# Patient Record
Sex: FEMALE | Race: WHITE | NOT HISPANIC OR LATINO | Employment: OTHER | URBAN - METROPOLITAN AREA
[De-identification: names, ages, dates, MRNs, and addresses within clinical notes are randomized per-mention and may not be internally consistent; named-entity substitution may affect disease eponyms.]

---

## 2019-08-07 ENCOUNTER — OFFICE VISIT (OUTPATIENT)
Dept: MEDICAL GROUP | Facility: IMAGING CENTER | Age: 69
End: 2019-08-07
Payer: COMMERCIAL

## 2019-08-07 VITALS
DIASTOLIC BLOOD PRESSURE: 88 MMHG | BODY MASS INDEX: 37.38 KG/M2 | TEMPERATURE: 98.3 F | HEART RATE: 79 BPM | SYSTOLIC BLOOD PRESSURE: 128 MMHG | WEIGHT: 246.6 LBS | OXYGEN SATURATION: 93 % | RESPIRATION RATE: 15 BRPM | HEIGHT: 68 IN

## 2019-08-07 DIAGNOSIS — Z11.59 NEED FOR HEPATITIS C SCREENING TEST: ICD-10-CM

## 2019-08-07 DIAGNOSIS — Z86.006 HISTORY OF MELANOMA IN SITU: ICD-10-CM

## 2019-08-07 DIAGNOSIS — Z13.6 SCREENING FOR CARDIOVASCULAR CONDITION: ICD-10-CM

## 2019-08-07 DIAGNOSIS — Z12.39 SCREENING FOR BREAST CANCER: ICD-10-CM

## 2019-08-07 DIAGNOSIS — M25.541 ARTHRALGIA OF BOTH HANDS: ICD-10-CM

## 2019-08-07 DIAGNOSIS — Z82.49 FAMILY HISTORY OF CARDIOVASCULAR DISEASE: ICD-10-CM

## 2019-08-07 DIAGNOSIS — Z13.0 SCREENING FOR DEFICIENCY ANEMIA: ICD-10-CM

## 2019-08-07 DIAGNOSIS — Z13.1 SCREENING FOR DIABETES MELLITUS: ICD-10-CM

## 2019-08-07 DIAGNOSIS — E66.9 OBESITY (BMI 30-39.9): ICD-10-CM

## 2019-08-07 DIAGNOSIS — Z13.21 ENCOUNTER FOR VITAMIN DEFICIENCY SCREENING: ICD-10-CM

## 2019-08-07 DIAGNOSIS — Z12.11 SCREEN FOR COLON CANCER: ICD-10-CM

## 2019-08-07 DIAGNOSIS — Z13.29 SCREENING FOR ENDOCRINE DISORDER: ICD-10-CM

## 2019-08-07 DIAGNOSIS — M25.50 ARTHRALGIA, UNSPECIFIED JOINT: ICD-10-CM

## 2019-08-07 DIAGNOSIS — Z13.220 SCREENING, LIPID: ICD-10-CM

## 2019-08-07 DIAGNOSIS — M17.11 PRIMARY OSTEOARTHRITIS OF RIGHT KNEE: ICD-10-CM

## 2019-08-07 DIAGNOSIS — M25.542 ARTHRALGIA OF BOTH HANDS: ICD-10-CM

## 2019-08-07 PROCEDURE — 99205 OFFICE O/P NEW HI 60 MIN: CPT | Performed by: FAMILY MEDICINE

## 2019-08-07 ASSESSMENT — PAIN SCALES - GENERAL: PAINLEVEL: NO PAIN

## 2019-08-07 ASSESSMENT — PATIENT HEALTH QUESTIONNAIRE - PHQ9: CLINICAL INTERPRETATION OF PHQ2 SCORE: 0

## 2019-08-07 NOTE — PROGRESS NOTES
Chief Complaint   Patient presents with   • Establish Care     blood work requested.    • Arthritis     starting to get arthritic pain in joints        HPI:  68 y.o. female new patient here to review medical issues and establish care.   Her mother is a patient with our office.   Patient has not had any labs in 5 years. Would like to complete routine lab work.     Arthritic pain in joints:   In January- had evaluation and right side knee xray. Does gardening and may be on her knees.   Has done walking in the past, but has been limited due to right knee. No specific injury to area otherwise.  Does about 10 minutes of stretching daily.   Notices symptoms of 1st CMC joint region bilaterally. No specific injury to area otherwise.   Mother had some elevated uric acid and RF levels.     Obesity- trying to lose weight. Has lost about 10 lbs since January.     History of Melanoma in situ 2017 right side of forehead. Sees dermatology regularly- [facility], Dr. Param Armenta.   Left side of forehead- she believes was a basal cell cancer.     Her mother is 101 yo, had triple bypass around age 81 yo, also with heart failure, and chronic kidney issues.   Father passed from MI age 73 yo.      Health Maintenance  Last pap: 2012, 2011 endometrial ablation- slight bleeding at end menopause, period late 50s.   History of abnormal pap: negative  Immunizations: Td/Tdap reviewed recommendations;  Influenza vaccine recommend annually; Shingles- not done, reviewed recommendations.   PCV 13 declined; PPSV 23  declined   Mammogram: 2015  Colonoscopy: never, does not desire. No family history of colon.   Dexa Scan: 2012, declined at this time      Lifestyle:  Diet: coffee, some alcohol. Diet changing with mother's diet, helps with joint pain- cutting down sugars.   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    passed away 6 years ago, 2- 2.5 years took care of him, started in bladder. 6 months before mother came.  Work:  at [employer]- marriage, license. 15 years. Works full time.   Does okay at self care. 1 son, 34 yo.        Review of Systems   Constitutional: Negative for fever, chills and malaise/fatigue.   HENT: Negative for congestion, sore throat, or swallowing issues.   Eyes: Negative for pain or vision changes.   Respiratory: Negative for cough and shortness of breath.    Cardiovascular: Negative for leg swelling. No chest pain.   Gastrointestinal: Negative for nausea, vomiting, abdominal pain and diarrhea.   Genitourinary: Negative for dysuria and hematuria.   Skin: Negative for rash.   Neurological: Negative for dizziness, focal weakness and headaches.   Endo/Heme/Allergies: Does not bruise/bleed easily.   Psychiatric/Behavioral: Negative for depression.  The patient is not nervous/anxious.        Current Outpatient Medications:   •  NON SPECIFIED, every day. Indications: Multi-Vitamin, Disp: , Rfl:   •  NON SPECIFIED, every day. Indications: Omega 3, Disp: , Rfl:     Allergies   Allergen Reactions   • Seasonal        Patient Active Problem List   Diagnosis   • Arthralgia   • Obesity (BMI 30-39.9)   • History of melanoma in situ   • Family history of cardiovascular disease       Past Medical History:   Diagnosis Date   • 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, excised and followed by [facility]       Past Surgical History:   Procedure Laterality Date   • ENDOMETRIAL ABLATION  2011       Family History   Problem Relation Age of Onset   • Heart Disease Mother         renal heart failure   • Heart Disease Father        Social History     Socioeconomic History   • Marital status:      Spouse name: Not on file   • Number of children: Not on file   • Years of education: Not on file   • Highest education level: Not on file  "  Occupational History   • Not on file   Social Needs   • Financial resource strain: Not on file   • Food insecurity:     Worry: Not on file     Inability: Not on file   • Transportation needs:     Medical: Not on file     Non-medical: Not on file   Tobacco Use   • Smoking status: Never Smoker   • Smokeless tobacco: Never Used   Substance and Sexual Activity   • Alcohol use: Yes     Comment: occasional   • Drug use: Never   • Sexual activity: Not on file       PHYSICAL EXAM:  /88 (BP Location: Left arm, Patient Position: Sitting, BP Cuff Size: Adult)   Pulse 79   Temp 36.8 °C (98.3 °F) (Temporal)   Resp 15   Ht 1.727 m (5' 8\")   Wt 111.9 kg (246 lb 9.6 oz)   SpO2 93%   BMI 37.50 kg/m²   Constitutional: She appears well-developed and well-nourished. She appears not diaphoretic. No distress.   HENT: Right Ear: External ear normal. Left Ear: External ear normal. Tympanic membranes clear and intact.   Nose: Nose normal.   Mouth/Throat: Oropharynx is clear and moist. No oropharyngeal exudate.     Eyes: Conjunctivae and extraocular motions are normal. Pupils are equal, round, and reactive to light. No scleral icterus.   Neck: Normal range of motion. Neck supple. No thyromegaly present.   Cardiovascular: Normal rate, regular rhythm, normal heart sounds and intact distal pulses.  Exam reveals no gallop and no friction rub.  No murmur heard. No carotid bruits.   Pulmonary/Chest: Effort normal and breath sounds normal. No respiratory distress. She has no wheezes. She has no rales.   Abdominal: Soft. Bowel sounds are normal. She exhibits no distension and no mass. No tenderness. She has no rebound and no guarding.   Lymphadenopathy:  She has no cervical adenopathy.   Neurological: She is alert. She has normal reflexes. No cranial nerve deficit. She exhibits normal muscle tone.   Skin: Skin is warm and dry. No rash noted. She is not diaphoretic. No erythema.  Right side forehead with large circular area of well " healed scar. Left side with linear scar of forehead noted.   Psychiatric: She has a normal mood and affect. Her behavior is normal.   Musculoskeletal: She exhibits no edema. Full strength throughout. 2+ DTR throughout. Knees without erythema/edema/ecchymosis/effusion. Right medial knee region with mild TTP, negative Zakiya's, otherwise ligaments intact and stable. Hand joint pain appear to be at bilateral 1st CMC joint region, slight TTP, overall good range of motion. Popliteal angle: 170-180 deg bilaterally      Reviewed imaging of right knee xray: some bone spurring and decreased in medial joint space noted.       ASSESSMENT/PLAN:    This is a 68 y.o. female    1. Arthralgia, unspecified joint  -Will further assess lab work. Discussed modification in diet and activities. Recommend anti-inflammatory diet, routine stretching/strengthening and avoiding aggravating factors. Consider use of glucosamine chondroitin, turmeric and acupuncture therapy as well. May use tylenol as needed.   WESTERGREN SED RATE    CRP QUANTITIVE (NON-CARDIAC)    RHEUMATOID ARTHRITIS FACTOR    URIC ACID    TSH WITH REFLEX TO FT4   2. Primary osteoarthritis of right knee- as above.      3. Arthralgia of both hands- as above.      4. Obesity (BMI 30-39.9)   Patient's body mass index is 37.5 kg/m². Exercise and nutrition counseling were performed at this visit.  May benefit from further weight loss with joint pain symptoms. Counseled on healthy diet and routine exercise.     5. History of melanoma in situ     6. Family history of cardiovascular disease  CANCELED: HOMOCYSTEINE   7. Screening for breast cancer  MA-SCREEN MAMMO W/CAD-BILAT   8. Screen for colon cancer  COLOGUARD (FIT DNA)   9. Screening for diabetes mellitus  Comp Metabolic Panel   10. Screening for deficiency anemia  CBC WITH DIFFERENTIAL   11. Screening for cardiovascular condition  Lipid Profile   12. Screening, lipid  Lipid Profile   13. Screening for endocrine disorder  TSH  WITH REFLEX TO FT4   14. Encounter for vitamin deficiency screening  VITAMIN D,25 HYDROXY   15. Need for hepatitis C screening test  HEP C VIRUS ANTIBODY   Recommend working on self care with adequate nutrition and fluid intake, routine exercise, adequate sleep and stress management.   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 Recommend schedule time for self care, consider outside resources for assistance with care taking as needed.   Discussed recommendations for routine lab work and health maintenance recommendations.     Return in about 1 month (around 9/7/2019).      This medical record contains text that has been entered with the assistance of computer voice recognition and dictation software.  Therefore, it may contain unintended errors in text, spelling, punctuation, or grammar.         > 60 minutes face to face time spent with this patient of which > 30  minutes spent on counseling and coordination of care as above, excluding any time for procedures.

## 2019-08-12 ENCOUNTER — HOSPITAL ENCOUNTER (OUTPATIENT)
Dept: LAB | Facility: MEDICAL CENTER | Age: 69
End: 2019-08-12
Attending: FAMILY MEDICINE
Payer: COMMERCIAL

## 2019-08-12 DIAGNOSIS — Z13.21 ENCOUNTER FOR VITAMIN DEFICIENCY SCREENING: ICD-10-CM

## 2019-08-12 DIAGNOSIS — Z13.0 SCREENING FOR DEFICIENCY ANEMIA: ICD-10-CM

## 2019-08-12 DIAGNOSIS — Z13.29 SCREENING FOR ENDOCRINE DISORDER: ICD-10-CM

## 2019-08-12 DIAGNOSIS — Z13.6 SCREENING FOR CARDIOVASCULAR CONDITION: ICD-10-CM

## 2019-08-12 DIAGNOSIS — Z13.1 SCREENING FOR DIABETES MELLITUS: ICD-10-CM

## 2019-08-12 DIAGNOSIS — M25.50 ARTHRALGIA, UNSPECIFIED JOINT: ICD-10-CM

## 2019-08-12 DIAGNOSIS — Z82.49 FAMILY HISTORY OF CARDIOVASCULAR DISEASE: ICD-10-CM

## 2019-08-12 DIAGNOSIS — Z11.59 NEED FOR HEPATITIS C SCREENING TEST: ICD-10-CM

## 2019-08-12 DIAGNOSIS — Z13.220 SCREENING, LIPID: ICD-10-CM

## 2019-08-12 LAB
25(OH)D3 SERPL-MCNC: 19 NG/ML (ref 30–100)
ALBUMIN SERPL BCP-MCNC: 3.8 G/DL (ref 3.2–4.9)
ALBUMIN/GLOB SERPL: 1.2 G/DL
ALP SERPL-CCNC: 62 U/L (ref 30–99)
ALT SERPL-CCNC: 12 U/L (ref 2–50)
ANION GAP SERPL CALC-SCNC: 8 MMOL/L (ref 0–11.9)
AST SERPL-CCNC: 13 U/L (ref 12–45)
BASOPHILS # BLD AUTO: 0.8 % (ref 0–1.8)
BASOPHILS # BLD: 0.05 K/UL (ref 0–0.12)
BILIRUB SERPL-MCNC: 0.7 MG/DL (ref 0.1–1.5)
BUN SERPL-MCNC: 25 MG/DL (ref 8–22)
CALCIUM SERPL-MCNC: 9.4 MG/DL (ref 8.5–10.5)
CHLORIDE SERPL-SCNC: 106 MMOL/L (ref 96–112)
CHOLEST SERPL-MCNC: 208 MG/DL (ref 100–199)
CO2 SERPL-SCNC: 26 MMOL/L (ref 20–33)
CREAT SERPL-MCNC: 0.71 MG/DL (ref 0.5–1.4)
CRP SERPL HS-MCNC: 0.52 MG/DL (ref 0–0.75)
EOSINOPHIL # BLD AUTO: 0.19 K/UL (ref 0–0.51)
EOSINOPHIL NFR BLD: 3 % (ref 0–6.9)
ERYTHROCYTE [DISTWIDTH] IN BLOOD BY AUTOMATED COUNT: 42.8 FL (ref 35.9–50)
ERYTHROCYTE [SEDIMENTATION RATE] IN BLOOD BY WESTERGREN METHOD: 15 MM/HOUR (ref 0–30)
FASTING STATUS PATIENT QL REPORTED: NORMAL
GLOBULIN SER CALC-MCNC: 3.2 G/DL (ref 1.9–3.5)
GLUCOSE SERPL-MCNC: 108 MG/DL (ref 65–99)
HCT VFR BLD AUTO: 49.6 % (ref 37–47)
HCV AB SER QL: NEGATIVE
HDLC SERPL-MCNC: 42 MG/DL
HGB BLD-MCNC: 15.6 G/DL (ref 12–16)
IMM GRANULOCYTES # BLD AUTO: 0.02 K/UL (ref 0–0.11)
IMM GRANULOCYTES NFR BLD AUTO: 0.3 % (ref 0–0.9)
LDLC SERPL CALC-MCNC: 149 MG/DL
LYMPHOCYTES # BLD AUTO: 1.92 K/UL (ref 1–4.8)
LYMPHOCYTES NFR BLD: 30.8 % (ref 22–41)
MCH RBC QN AUTO: 28.5 PG (ref 27–33)
MCHC RBC AUTO-ENTMCNC: 31.5 G/DL (ref 33.6–35)
MCV RBC AUTO: 90.7 FL (ref 81.4–97.8)
MONOCYTES # BLD AUTO: 0.54 K/UL (ref 0–0.85)
MONOCYTES NFR BLD AUTO: 8.7 % (ref 0–13.4)
NEUTROPHILS # BLD AUTO: 3.52 K/UL (ref 2–7.15)
NEUTROPHILS NFR BLD: 56.4 % (ref 44–72)
NRBC # BLD AUTO: 0 K/UL
NRBC BLD-RTO: 0 /100 WBC
PLATELET # BLD AUTO: 179 K/UL (ref 164–446)
PMV BLD AUTO: 11.7 FL (ref 9–12.9)
POTASSIUM SERPL-SCNC: 4 MMOL/L (ref 3.6–5.5)
PROT SERPL-MCNC: 7 G/DL (ref 6–8.2)
RBC # BLD AUTO: 5.47 M/UL (ref 4.2–5.4)
RHEUMATOID FACT SER IA-ACNC: <10 IU/ML (ref 0–14)
SODIUM SERPL-SCNC: 140 MMOL/L (ref 135–145)
TRIGL SERPL-MCNC: 85 MG/DL (ref 0–149)
TSH SERPL DL<=0.005 MIU/L-ACNC: 2.1 UIU/ML (ref 0.38–5.33)
URATE SERPL-MCNC: 5.6 MG/DL (ref 1.9–8.2)
WBC # BLD AUTO: 6.2 K/UL (ref 4.8–10.8)

## 2019-08-12 PROCEDURE — 82306 VITAMIN D 25 HYDROXY: CPT

## 2019-08-12 PROCEDURE — 86431 RHEUMATOID FACTOR QUANT: CPT

## 2019-08-12 PROCEDURE — 83090 ASSAY OF HOMOCYSTEINE: CPT

## 2019-08-12 PROCEDURE — 85652 RBC SED RATE AUTOMATED: CPT

## 2019-08-12 PROCEDURE — 84443 ASSAY THYROID STIM HORMONE: CPT

## 2019-08-12 PROCEDURE — 86803 HEPATITIS C AB TEST: CPT

## 2019-08-12 PROCEDURE — 84550 ASSAY OF BLOOD/URIC ACID: CPT

## 2019-08-12 PROCEDURE — 36415 COLL VENOUS BLD VENIPUNCTURE: CPT

## 2019-08-12 PROCEDURE — 80053 COMPREHEN METABOLIC PANEL: CPT

## 2019-08-12 PROCEDURE — 85025 COMPLETE CBC W/AUTO DIFF WBC: CPT

## 2019-08-12 PROCEDURE — 86140 C-REACTIVE PROTEIN: CPT

## 2019-08-12 PROCEDURE — 80061 LIPID PANEL: CPT

## 2019-08-14 PROBLEM — Z82.49 FAMILY HISTORY OF CARDIOVASCULAR DISEASE: Status: ACTIVE | Noted: 2019-08-14

## 2019-08-14 PROBLEM — Z86.006 HISTORY OF MELANOMA IN SITU: Status: ACTIVE | Noted: 2019-08-14

## 2019-08-14 PROBLEM — E66.9 OBESITY (BMI 30-39.9): Status: ACTIVE | Noted: 2019-08-14

## 2019-08-14 PROBLEM — M25.50 ARTHRALGIA: Status: ACTIVE | Noted: 2019-08-14

## 2019-08-16 ENCOUNTER — HOSPITAL ENCOUNTER (OUTPATIENT)
Dept: RADIOLOGY | Facility: MEDICAL CENTER | Age: 69
End: 2019-08-16
Attending: FAMILY MEDICINE
Payer: COMMERCIAL

## 2019-08-16 DIAGNOSIS — Z12.39 SCREENING FOR BREAST CANCER: ICD-10-CM

## 2019-08-16 PROCEDURE — 77063 BREAST TOMOSYNTHESIS BI: CPT

## 2019-09-25 ENCOUNTER — OFFICE VISIT (OUTPATIENT)
Dept: MEDICAL GROUP | Facility: IMAGING CENTER | Age: 69
End: 2019-09-25
Payer: COMMERCIAL

## 2019-09-25 VITALS
DIASTOLIC BLOOD PRESSURE: 84 MMHG | HEART RATE: 74 BPM | OXYGEN SATURATION: 94 % | SYSTOLIC BLOOD PRESSURE: 126 MMHG | RESPIRATION RATE: 12 BRPM | HEIGHT: 68 IN | WEIGHT: 243 LBS | TEMPERATURE: 98.4 F | BODY MASS INDEX: 36.83 KG/M2

## 2019-09-25 DIAGNOSIS — M17.11 PRIMARY OSTEOARTHRITIS OF RIGHT KNEE: ICD-10-CM

## 2019-09-25 DIAGNOSIS — M25.542 ARTHRALGIA OF BOTH HANDS: ICD-10-CM

## 2019-09-25 DIAGNOSIS — E66.9 OBESITY (BMI 30-39.9): ICD-10-CM

## 2019-09-25 DIAGNOSIS — E55.9 VITAMIN D INSUFFICIENCY: ICD-10-CM

## 2019-09-25 DIAGNOSIS — M25.541 ARTHRALGIA OF BOTH HANDS: ICD-10-CM

## 2019-09-25 DIAGNOSIS — E78.5 HYPERLIPIDEMIA LDL GOAL <130: ICD-10-CM

## 2019-09-25 DIAGNOSIS — R73.01 IFG (IMPAIRED FASTING GLUCOSE): ICD-10-CM

## 2019-09-25 DIAGNOSIS — M25.50 ARTHRALGIA OF MULTIPLE JOINTS: ICD-10-CM

## 2019-09-25 DIAGNOSIS — R71.8 ELEVATED HEMATOCRIT: ICD-10-CM

## 2019-09-25 DIAGNOSIS — Z23 NEED FOR INFLUENZA VACCINATION: ICD-10-CM

## 2019-09-25 PROCEDURE — 90662 IIV NO PRSV INCREASED AG IM: CPT | Performed by: FAMILY MEDICINE

## 2019-09-25 PROCEDURE — 99214 OFFICE O/P EST MOD 30 MIN: CPT | Mod: 25 | Performed by: FAMILY MEDICINE

## 2019-09-25 PROCEDURE — 90471 IMMUNIZATION ADMIN: CPT | Performed by: FAMILY MEDICINE

## 2019-09-25 SDOH — HEALTH STABILITY: MENTAL HEALTH: HOW OFTEN DO YOU HAVE A DRINK CONTAINING ALCOHOL?: MONTHLY OR LESS

## 2019-09-25 ASSESSMENT — PAIN SCALES - GENERAL: PAINLEVEL: 2=MINIMAL-SLIGHT

## 2019-09-25 NOTE — PROGRESS NOTES
SUBJECTIVE:    Chief Complaint   Patient presents with   • Results   • Arthritis       HPI:     Oneyda Pratt is a 68 y.o. female here for for follow-up of prior issues and lab results.    She recently started a new omega-3 supplement 500 mg daily.  Garden [employer] life brand.  Also started a new multivitamin.  She has been taking vitamin D 1000 IUs daily.  Recent lab results show low levels.  She will plan to increase her dose.    Obesity- She has been working on weight loss and has lost a few pounds since her last visit.  She does stress eat at times.  She knows what to do but does not have a regular exercise regimen in place currently.  She does otherwise eat healthy at home with her mother who is 101 yo.    Elevated hematocrit-on recent blood work. Drank a little water prior to lab work.   Elevated glucose-mild.  Elevated cholesterol and LDL noted on recent labs.     Arthritis joint pain- stable. Labs negative for RF and CRP.        ROS:  Denies any recent fevers or chills. No nausea or vomiting. No diarrhea. No chest pains or shortness of breath. No lower extremity edema.    Current Outpatient Medications on File Prior to Visit   Medication Sig Dispense Refill   • vitamin D (CHOLECALCIFEROL) 1000 UNIT Tab Take 1,000 Units by mouth every day.     • NON SPECIFIED every day. Indications: Multi-Vitamin     • NON SPECIFIED 500 mg every day. Indications: Omega 3       No current facility-administered medications on file prior to visit.        Allergies   Allergen Reactions   • Seasonal        Patient Active Problem List    Diagnosis Date Noted   • Arthralgia of multiple joints 08/14/2019   • Obesity (BMI 30-39.9) 08/14/2019   • History of melanoma in situ 08/14/2019   • Family history of cardiovascular disease 08/14/2019       Past Medical History:   Diagnosis Date   • 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, excised and followed by [facility]       OBJECTIVE:   /84   Pulse 74   Temp 36.9 °C (98.4  "°F)   Resp 12   Ht 1.727 m (5' 8\")   Wt 110.2 kg (243 lb)   SpO2 94%   BMI 36.95 kg/m²   General: Well-developed well-nourished female, no acute distress  Neck: supple, no lymphadenopathy- cervical or supraclavicular, no thyromegaly  Cardiovascular: regular rate and rhythm, no murmurs, gallops, rubs  Lungs: clear to auscultation bilaterally, no wheezes, crackles, or rhonchi  Abdomen: +bowel sounds, soft, nontender, nondistended, no rebound, no guarding, no hepatosplenomegaly  Extremities: no cyanosis, clubbing, edema  Skin: Warm and dry  Psych: appropriate mood and affect       Ref. Range 8/12/2019 06:20   WBC Latest Ref Range: 4.8 - 10.8 K/uL 6.2   RBC Latest Ref Range: 4.20 - 5.40 M/uL 5.47 (H)   Hemoglobin Latest Ref Range: 12.0 - 16.0 g/dL 15.6   Hematocrit Latest Ref Range: 37.0 - 47.0 % 49.6 (H)   MCV Latest Ref Range: 81.4 - 97.8 fL 90.7   MCH Latest Ref Range: 27.0 - 33.0 pg 28.5   MCHC Latest Ref Range: 33.6 - 35.0 g/dL 31.5 (L)   RDW Latest Ref Range: 35.9 - 50.0 fL 42.8   Platelet Count Latest Ref Range: 164 - 446 K/uL 179   MPV Latest Ref Range: 9.0 - 12.9 fL 11.7   Neutrophils-Polys Latest Ref Range: 44.00 - 72.00 % 56.40   Neutrophils (Absolute) Latest Ref Range: 2.00 - 7.15 K/uL 3.52   Lymphocytes Latest Ref Range: 22.00 - 41.00 % 30.80   Lymphs (Absolute) Latest Ref Range: 1.00 - 4.80 K/uL 1.92   Monocytes Latest Ref Range: 0.00 - 13.40 % 8.70   Monos (Absolute) Latest Ref Range: 0.00 - 0.85 K/uL 0.54   Eosinophils Latest Ref Range: 0.00 - 6.90 % 3.00   Eos (Absolute) Latest Ref Range: 0.00 - 0.51 K/uL 0.19   Basophils Latest Ref Range: 0.00 - 1.80 % 0.80   Baso (Absolute) Latest Ref Range: 0.00 - 0.12 K/uL 0.05   Immature Granulocytes Latest Ref Range: 0.00 - 0.90 % 0.30   Immature Granulocytes (abs) Latest Ref Range: 0.00 - 0.11 K/uL 0.02   Nucleated RBC Latest Units: /100 WBC 0.00   NRBC (Absolute) Latest Units: K/uL 0.00   Sed Rate [facility] Latest Ref Range: 0 - 30 mm/hour 15   Sodium " Latest Ref Range: 135 - 145 mmol/L 140   Potassium Latest Ref Range: 3.6 - 5.5 mmol/L 4.0   Chloride Latest Ref Range: 96 - 112 mmol/L 106   Co2 Latest Ref Range: 20 - 33 mmol/L 26   Anion Gap Latest Ref Range: 0.0 - 11.9  8.0   Glucose Latest Ref Range: 65 - 99 mg/dL 108 (H)   Bun Latest Ref Range: 8 - 22 mg/dL 25 (H)   Creatinine Latest Ref Range: 0.50 - 1.40 mg/dL 0.71   GFR If  Latest Ref Range: >60 mL/min/1.73 m 2 >60   GFR If Non  Latest Ref Range: >60 mL/min/1.73 m 2 >60   Calcium Latest Ref Range: 8.5 - 10.5 mg/dL 9.4   AST(SGOT) Latest Ref Range: 12 - 45 U/L 13   ALT(SGPT) Latest Ref Range: 2 - 50 U/L 12   Alkaline Phosphatase Latest Ref Range: 30 - 99 U/L 62   Total Bilirubin Latest Ref Range: 0.1 - 1.5 mg/dL 0.7   Albumin Latest Ref Range: 3.2 - 4.9 g/dL 3.8   Total Protein Latest Ref Range: 6.0 - 8.2 g/dL 7.0   Globulin Latest Ref Range: 1.9 - 3.5 g/dL 3.2   A-G Ratio Latest Units: g/dL 1.2   Uric Acid Latest Ref Range: 1.9 - 8.2 mg/dL 5.6   Fasting Status Unknown Fasting   Cholesterol,Tot Latest Ref Range: 100 - 199 mg/dL 208 (H)   Triglycerides Latest Ref Range: 0 - 149 mg/dL 85   HDL Latest Ref Range: >=40 mg/dL 42   LDL Latest Ref Range: <100 mg/dL 149 (H)   25-Hydroxy   Vitamin D 25 Latest Ref Range: 30 - 100 ng/mL 19 (L)   TSH Latest Ref Range: 0.380 - 5.330 uIU/mL 2.100   Hepatitis C Antibody Latest Ref Range: Negative  Negative   Rheumatoid Factor -Neph- Latest Ref Range: 0 - 14 IU/mL <10   Stat C-Reactive Protein Latest Ref Range: 0.00 - 0.75 mg/dL 0.52         ASSESSMENT/PLAN:    68 y.o.female with arthralgia.     1. Hyperlipidemia LDL goal <130   -She will try diet and lifestyle modification, discussed in further detail today. Recommend low cholesterol, high fiber diet. Recommend routine exercise. Consider supplement therapy if needed. Repeat labs in 4-6 months.  HOMOCYSTEINE    Comp Metabolic Panel    Lipid Profile   2. IFG (impaired fasting glucose)-mild.    -Reviewed diet and exercise recommendations.      3. Elevated hematocrit - unclear etiology.   -Discussed adequate hydration. Will recheck in future.  CBC WITHOUT DIFFERENTIAL   4. Vitamin D insufficiency   -Vitamin D3, 3346-2902 IU daily. Recheck labs in future.  VITAMIN D,25 HYDROXY   5. Obesity (BMI 30-39.9)   Patient's body mass index is 36.95 kg/m². Exercise and nutrition counseling were performed at this visit.    6. Arthralgia of multiple joints- stable. Reviewed labs.   -Recommend gentle and routine exercise regimen. Consider anti-inflammatory diet, supplements and acupuncture therapy. Monitor.      7. Arthralgia of both hands     8. Primary osteoarthritis of right knee     9. Need for influenza vaccination  INFLUENZA VACCINE, HIGH DOSE (65+ ONLY)   Recommend working on self care with adequate nutrition and fluid intake, routine exercise, adequate sleep and stress management.       Return in about 4 months (around 1/25/2020).    This medical record contains text that has been entered with the assistance of computer voice recognition and dictation software.  Therefore, it may contain unintended errors in text, spelling, punctuation, or grammar.

## 2020-06-24 ENCOUNTER — NON-PROVIDER VISIT (OUTPATIENT)
Dept: MEDICAL GROUP | Facility: IMAGING CENTER | Age: 70
End: 2020-06-24
Payer: COMMERCIAL

## 2020-06-24 ENCOUNTER — HOSPITAL ENCOUNTER (OUTPATIENT)
Facility: MEDICAL CENTER | Age: 70
End: 2020-06-24
Attending: FAMILY MEDICINE
Payer: COMMERCIAL

## 2020-06-24 DIAGNOSIS — R10.9 FLANK PAIN: ICD-10-CM

## 2020-06-24 LAB
APPEARANCE UR: NORMAL
BILIRUB UR STRIP-MCNC: NEGATIVE MG/DL
COLOR UR AUTO: YELLOW
GLUCOSE UR STRIP.AUTO-MCNC: NEGATIVE MG/DL
KETONES UR STRIP.AUTO-MCNC: NEGATIVE MG/DL
LEUKOCYTE ESTERASE UR QL STRIP.AUTO: NORMAL
NITRITE UR QL STRIP.AUTO: NEGATIVE
PH UR STRIP.AUTO: 5 [PH] (ref 5–8)
PROT UR QL STRIP: NEGATIVE MG/DL
RBC UR QL AUTO: NEGATIVE
SP GR UR STRIP.AUTO: 1.03
UROBILINOGEN UR STRIP-MCNC: 0.2 MG/DL

## 2020-06-24 PROCEDURE — 87077 CULTURE AEROBIC IDENTIFY: CPT

## 2020-06-24 PROCEDURE — 87086 URINE CULTURE/COLONY COUNT: CPT

## 2020-06-24 PROCEDURE — 87186 SC STD MICRODIL/AGAR DIL: CPT

## 2020-06-24 PROCEDURE — 81002 URINALYSIS NONAUTO W/O SCOPE: CPT | Performed by: FAMILY MEDICINE

## 2020-06-25 NOTE — PROGRESS NOTES
Pt reporting bladder pressure and left sided flank pain. Provided urine sample. Results reviewed with Dr. Martinez. Sample sent for culture.

## 2020-06-28 LAB
BACTERIA UR CULT: ABNORMAL
BACTERIA UR CULT: ABNORMAL
SIGNIFICANT IND 70042: ABNORMAL
SITE SITE: ABNORMAL
SOURCE SOURCE: ABNORMAL

## 2020-06-30 RX ORDER — SULFAMETHOXAZOLE AND TRIMETHOPRIM 800; 160 MG/1; MG/1
1 TABLET ORAL 2 TIMES DAILY
Qty: 6 TAB | Refills: 0 | Status: SHIPPED | OUTPATIENT
Start: 2020-06-30 | End: 2021-02-17

## 2020-07-11 ENCOUNTER — HOSPITAL ENCOUNTER (OUTPATIENT)
Dept: LAB | Facility: MEDICAL CENTER | Age: 70
End: 2020-07-11
Attending: FAMILY MEDICINE
Payer: COMMERCIAL

## 2020-07-11 DIAGNOSIS — E78.5 HYPERLIPIDEMIA LDL GOAL <130: ICD-10-CM

## 2020-07-11 LAB
ALBUMIN SERPL BCP-MCNC: 4.2 G/DL (ref 3.2–4.9)
ALBUMIN/GLOB SERPL: 1.4 G/DL
ALP SERPL-CCNC: 73 U/L (ref 30–99)
ALT SERPL-CCNC: 17 U/L (ref 2–50)
ANION GAP SERPL CALC-SCNC: 8 MMOL/L (ref 7–16)
AST SERPL-CCNC: 14 U/L (ref 12–45)
BILIRUB SERPL-MCNC: 0.6 MG/DL (ref 0.1–1.5)
BUN SERPL-MCNC: 20 MG/DL (ref 8–22)
CALCIUM SERPL-MCNC: 9.1 MG/DL (ref 8.5–10.5)
CHLORIDE SERPL-SCNC: 103 MMOL/L (ref 96–112)
CHOLEST SERPL-MCNC: 214 MG/DL (ref 100–199)
CO2 SERPL-SCNC: 25 MMOL/L (ref 20–33)
CREAT SERPL-MCNC: 0.57 MG/DL (ref 0.5–1.4)
FASTING STATUS PATIENT QL REPORTED: NORMAL
GLOBULIN SER CALC-MCNC: 2.9 G/DL (ref 1.9–3.5)
GLUCOSE SERPL-MCNC: 120 MG/DL (ref 65–99)
HCYS SERPL-SCNC: 7.33 UMOL/L
HDLC SERPL-MCNC: 49 MG/DL
LDLC SERPL CALC-MCNC: 146 MG/DL
POTASSIUM SERPL-SCNC: 4.2 MMOL/L (ref 3.6–5.5)
PROT SERPL-MCNC: 7.1 G/DL (ref 6–8.2)
SODIUM SERPL-SCNC: 136 MMOL/L (ref 135–145)
TRIGL SERPL-MCNC: 97 MG/DL (ref 0–149)

## 2020-07-11 PROCEDURE — 80061 LIPID PANEL: CPT

## 2020-07-11 PROCEDURE — 36415 COLL VENOUS BLD VENIPUNCTURE: CPT

## 2020-07-11 PROCEDURE — 80053 COMPREHEN METABOLIC PANEL: CPT

## 2020-07-11 PROCEDURE — 83090 ASSAY OF HOMOCYSTEINE: CPT

## 2020-07-17 ENCOUNTER — HOSPITAL ENCOUNTER (OUTPATIENT)
Facility: MEDICAL CENTER | Age: 70
End: 2020-07-17
Attending: FAMILY MEDICINE
Payer: COMMERCIAL

## 2020-07-17 ENCOUNTER — OFFICE VISIT (OUTPATIENT)
Dept: MEDICAL GROUP | Facility: IMAGING CENTER | Age: 70
End: 2020-07-17
Payer: COMMERCIAL

## 2020-07-17 VITALS
DIASTOLIC BLOOD PRESSURE: 76 MMHG | TEMPERATURE: 98.3 F | BODY MASS INDEX: 36.98 KG/M2 | OXYGEN SATURATION: 98 % | HEIGHT: 68 IN | HEART RATE: 76 BPM | RESPIRATION RATE: 12 BRPM | WEIGHT: 244 LBS | SYSTOLIC BLOOD PRESSURE: 122 MMHG

## 2020-07-17 DIAGNOSIS — Z86.006 HISTORY OF MELANOMA IN SITU: ICD-10-CM

## 2020-07-17 DIAGNOSIS — Z82.49 FAMILY HISTORY OF CARDIOVASCULAR DISEASE: ICD-10-CM

## 2020-07-17 DIAGNOSIS — Z78.0 POST-MENOPAUSAL: ICD-10-CM

## 2020-07-17 DIAGNOSIS — R82.90 ABNORMAL URINE: ICD-10-CM

## 2020-07-17 DIAGNOSIS — E78.5 HYPERLIPIDEMIA LDL GOAL <130: ICD-10-CM

## 2020-07-17 DIAGNOSIS — M25.50 ARTHRALGIA OF MULTIPLE JOINTS: ICD-10-CM

## 2020-07-17 DIAGNOSIS — L53.9 ERYTHEMA OF SKIN: ICD-10-CM

## 2020-07-17 DIAGNOSIS — Z12.11 SCREEN FOR COLON CANCER: ICD-10-CM

## 2020-07-17 DIAGNOSIS — R73.03 PREDIABETES: ICD-10-CM

## 2020-07-17 DIAGNOSIS — F43.9 STRESS: ICD-10-CM

## 2020-07-17 DIAGNOSIS — R73.01 IFG (IMPAIRED FASTING GLUCOSE): ICD-10-CM

## 2020-07-17 DIAGNOSIS — E66.9 OBESITY (BMI 30-39.9): ICD-10-CM

## 2020-07-17 LAB
APPEARANCE UR: NORMAL
BILIRUB UR STRIP-MCNC: NORMAL MG/DL
COLOR UR AUTO: YELLOW
GLUCOSE UR STRIP.AUTO-MCNC: NORMAL MG/DL
HBA1C MFR BLD: 6 % (ref 0–5.6)
INT CON NEG: NEGATIVE
INT CON POS: POSITIVE
KETONES UR STRIP.AUTO-MCNC: 15 MG/DL
LEUKOCYTE ESTERASE UR QL STRIP.AUTO: NORMAL
NITRITE UR QL STRIP.AUTO: NORMAL
PH UR STRIP.AUTO: 6 [PH] (ref 5–8)
PROT UR QL STRIP: 6 MG/DL
RBC UR QL AUTO: NORMAL
SP GR UR STRIP.AUTO: 1.02
UROBILINOGEN UR STRIP-MCNC: 1 MG/DL

## 2020-07-17 PROCEDURE — 99214 OFFICE O/P EST MOD 30 MIN: CPT | Performed by: FAMILY MEDICINE

## 2020-07-17 PROCEDURE — 83036 HEMOGLOBIN GLYCOSYLATED A1C: CPT | Performed by: FAMILY MEDICINE

## 2020-07-17 PROCEDURE — 87077 CULTURE AEROBIC IDENTIFY: CPT

## 2020-07-17 PROCEDURE — 87086 URINE CULTURE/COLONY COUNT: CPT

## 2020-07-17 PROCEDURE — 87186 SC STD MICRODIL/AGAR DIL: CPT

## 2020-07-17 PROCEDURE — 81002 URINALYSIS NONAUTO W/O SCOPE: CPT | Performed by: FAMILY MEDICINE

## 2020-07-17 SDOH — HEALTH STABILITY: MENTAL HEALTH: HOW OFTEN DO YOU HAVE A DRINK CONTAINING ALCOHOL?: 2-4 TIMES A MONTH

## 2020-07-17 SDOH — HEALTH STABILITY: MENTAL HEALTH: HOW MANY STANDARD DRINKS CONTAINING ALCOHOL DO YOU HAVE ON A TYPICAL DAY?: 1 OR 2

## 2020-07-17 SDOH — HEALTH STABILITY: MENTAL HEALTH: HOW OFTEN DO YOU HAVE 6 OR MORE DRINKS ON ONE OCCASION?: NEVER

## 2020-07-17 ASSESSMENT — PATIENT HEALTH QUESTIONNAIRE - PHQ9: CLINICAL INTERPRETATION OF PHQ2 SCORE: 0

## 2020-07-17 ASSESSMENT — FIBROSIS 4 INDEX: FIB4 SCORE: 1.31

## 2020-07-17 ASSESSMENT — PAIN SCALES - GENERAL: PAINLEVEL: NO PAIN

## 2020-07-17 NOTE — PROGRESS NOTES
SUBJECTIVE:    Chief Complaint   Patient presents with   • Lab Results   • Hyperlipidemia       HPI:    Oneyda Pratt is a 69 y.o. female impaired fasting glucose and hyperlipidemia here to review lab results.  Patient has some recent stressors.  Found out her 75-year-old sister who lives in [facility] has stage IV kidney cancer.  Her sister is going through chemotherapy at the [facility] and has her children around her in that area.    Patient also lives with her 102-year-old mother and was the main caretaker.  Mom has also been having some issues with hip pain and walking lately.  Thus, she has not been able to work on the weight loss that she intended as well as the lifestyle modifications.  She overall does eat healthy but could improve her diet.  She does watch her salt intake follow-up vegetables.  She is trying to cut down refined carbohydrates.  Some weeks might have more pastas and rice noodles.  Might have toast and coffee in the morning, she has cut down the fourth cup of half-and-half in her coffee in the morning a couple weeks ago.  Has not been exercising regularly.  She did get a iwatch that tells her to get up and move every so often.  She started to do morning stretches.    Her labs show an increase in fasting blood sugar to 120 and elevated cholesterol levels.  She does not desire to start medication therapy at this time.  Would like to hold off on medication therapy as much as possible.    Body mass index is 37.1 kg/m².    She has noticed some right medial ankle redness that comes and goes.  Has had some ankle issues in the past.    Previously had some urinary symptoms and urine culture was positive for E. Coli, labs from 6/24/2020.  States she did not not take the Bactrim therapy but her symptoms did improve.  She does wear a pad for leakage sometimes.  Denies any current dysuria or hematuria.  No other urinary symptoms at this time.    History of melanoma and sees a  "dermatologist regularly.  She does have to get routine checks and some skin lesions removed.      HM:   Last pap: 2012, 2011 endometrial ablation- slight bleeding at end menopause, period late 50s.   History of abnormal pap: negative  Colonoscopy: She will plan to complete FIT  Dexa: declined to do at this time  Immunizations: As recommended  Mammogram: August 2019      ROS:  Denies any recent fevers or chills. No nausea or vomiting. No diarrhea. No chest pains or shortness of breath. No lower extremity edema.    Current Outpatient Medications on File Prior to Visit   Medication Sig Dispense Refill   • vitamin D (CHOLECALCIFEROL) 1000 UNIT Tab Take 5,000 Units by mouth every day.     • NON SPECIFIED every day. Indications: Multi-Vitamin     • NON SPECIFIED 500 mg every day. Indications: Omega 3     • sulfamethoxazole-trimethoprim (BACTRIM DS) 800-160 MG tablet Take 1 Tab by mouth 2 times a day. (Patient not taking: Reported on 7/17/2020) 6 Tab 0     No current facility-administered medications on file prior to visit.        Allergies   Allergen Reactions   • Seasonal        Patient Active Problem List    Diagnosis Date Noted   • Prediabetes 07/17/2020   • Hyperlipidemia LDL goal <130 07/17/2020   • IFG (impaired fasting glucose) 07/17/2020   • Arthralgia of multiple joints 08/14/2019   • Obesity (BMI 30-39.9) 08/14/2019   • History of melanoma in situ 08/14/2019   • Family history of cardiovascular disease 08/14/2019       Past Medical History:   Diagnosis Date   • 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, excised and followed by [facility]           OBJECTIVE:   /76   Pulse 76   Temp 36.8 °C (98.3 °F)   Resp 12   Ht 1.727 m (5' 8\")   Wt 110.7 kg (244 lb)   SpO2 98%   BMI 37.10 kg/m²   General: Well-developed well-nourished female, no acute distress  Neck: supple, no lymphadenopathy- cervical or supraclavicular, no thyromegaly  Cardiovascular: regular rate and rhythm, no murmurs, gallops, " rubs  Lungs: clear to auscultation bilaterally, no wheezes, crackles, or rhonchi  Abdomen: +bowel sounds, soft, nontender, nondistended, no rebound, no guarding, no hepatosplenomegaly  Extremities: no cyanosis, clubbing, edema.  Right medial ankle region with slight erythema of skin due to increased vascularity- varicosities the area.  Skin: Warm and dry  Psych: appropriate mood and affect    POC hemoglobin A1c: 6%     Ref. Range 7/11/2020 07:18   Sodium Latest Ref Range: 135 - 145 mmol/L 136   Potassium Latest Ref Range: 3.6 - 5.5 mmol/L 4.2   Chloride Latest Ref Range: 96 - 112 mmol/L 103   Co2 Latest Ref Range: 20 - 33 mmol/L 25   Anion Gap Latest Ref Range: 7.0 - 16.0  8.0   Glucose Latest Ref Range: 65 - 99 mg/dL 120 (H)   Bun Latest Ref Range: 8 - 22 mg/dL 20   Creatinine Latest Ref Range: 0.50 - 1.40 mg/dL 0.57   GFR If  Latest Ref Range: >60 mL/min/1.73 m 2 >60   GFR If Non  Latest Ref Range: >60 mL/min/1.73 m 2 >60   Calcium Latest Ref Range: 8.5 - 10.5 mg/dL 9.1   AST(SGOT) Latest Ref Range: 12 - 45 U/L 14   ALT(SGPT) Latest Ref Range: 2 - 50 U/L 17   Alkaline Phosphatase Latest Ref Range: 30 - 99 U/L 73   Total Bilirubin Latest Ref Range: 0.1 - 1.5 mg/dL 0.6   Albumin Latest Ref Range: 3.2 - 4.9 g/dL 4.2   Total Protein Latest Ref Range: 6.0 - 8.2 g/dL 7.1   Globulin Latest Ref Range: 1.9 - 3.5 g/dL 2.9   A-G Ratio Latest Units: g/dL 1.4   Fasting Status Unknown Fasting   Cholesterol,Tot Latest Ref Range: 100 - 199 mg/dL 214 (H)   Triglycerides Latest Ref Range: 0 - 149 mg/dL 97   HDL Latest Ref Range: >=40 mg/dL 49   LDL Latest Ref Range: <100 mg/dL 146 (H)   Homocysteine Latest Ref Range: <11.00 umol/L 7.33     Urine poc: Small blood and trace leuk esterase noted, + ketones    ASSESSMENT/PLAN:    69 y.o.female with elevated fasting glucose, hyperlipidemia, history of melanoma in situ and family history of cardiovascular disease.    1. Prediabetes     2. IFG (impaired  fasting glucose)  POCT  A1C    Comp Metabolic Panel   3. Hyperlipidemia LDL goal <130  Comp Metabolic Panel    Lipid Profile   4. Family history of cardiovascular disease     5. Obesity (BMI 30-39.9)     6. Erythema of skin -right medial ankle.  Appears due to varicosities of area.  Intermittent.  Monitor.  Follow-up as needed.    7. Arthralgia of multiple joints     8. Abnormal urine  POCT Urinalysis    URINE CULTURE(NEW)   9. History of melanoma in situ     10. Post-menopausal  DS-BONE DENSITY STUDY (DEXA)-patient will consider scheduling.   11. Screen for colon cancer  OCCULT BLOOD FECES IMMUNOASSAY   12.    Stress  -Prediabetes and hyperlipidemia with hemoglobin A1c at 6.0% Counseled patient on appropriate diet - low sugar, low cholesterol and high-fiber.  Recommend routine exercise activities.  Counseled on specific activities patient may consider.  Patient declined medication therapy at this time which were reviewed and recommended.  Discussed possible other supplement therapy.  We will plan to check lab work in 3 months.  May consider CT cardiac scoring in future due to family history of cardiovascular disease and hyperlipidemia.  -Recommend anti-inflammatory diet, routine stretching and strengthening to help manage joint pain.  -Patient's body mass index is 37.1 kg/m². Exercise and nutrition counseling were performed at this visit.  -Previously with symptoms and positive urine culture.  Patient did not take antibiotic therapy.  Currently asymptomatic.  Will recheck urine culture.  May need to further monitor UA in future.  -Recommend routine follow-up with dermatology.  -Patient may consider routine Pap and gynecological exam in future.  -Counseled on management of stress.  Recommend routine daily meditation exercises, physical exercise, and healthy diet.  Continue to monitor.    Return in about 3 months (around 10/17/2020).    This medical record contains text that has been entered with the assistance of  computer voice recognition and dictation software.  Therefore, it may contain unintended errors in text, spelling, punctuation, or grammar.

## 2020-10-02 ENCOUNTER — IMMUNIZATION (OUTPATIENT)
Dept: SOCIAL WORK | Facility: CLINIC | Age: 70
End: 2020-10-02
Payer: COMMERCIAL

## 2020-10-02 DIAGNOSIS — Z23 NEED FOR VACCINATION: ICD-10-CM

## 2020-10-02 PROCEDURE — 90471 IMMUNIZATION ADMIN: CPT | Performed by: REGISTERED NURSE

## 2020-10-02 PROCEDURE — 90662 IIV NO PRSV INCREASED AG IM: CPT | Performed by: REGISTERED NURSE

## 2020-12-24 ENCOUNTER — TELEPHONE (OUTPATIENT)
Dept: MEDICAL GROUP | Facility: IMAGING CENTER | Age: 70
End: 2020-12-24

## 2020-12-24 DIAGNOSIS — E55.9 VITAMIN D INSUFFICIENCY: ICD-10-CM

## 2020-12-24 DIAGNOSIS — R71.8 ELEVATED HEMATOCRIT: ICD-10-CM

## 2020-12-24 DIAGNOSIS — R73.01 IFG (IMPAIRED FASTING GLUCOSE): ICD-10-CM

## 2020-12-24 DIAGNOSIS — E78.5 HYPERLIPIDEMIA LDL GOAL <130: ICD-10-CM

## 2020-12-24 DIAGNOSIS — R73.03 PREDIABETES: ICD-10-CM

## 2020-12-24 NOTE — TELEPHONE ENCOUNTER
Spoke with patient. She states she is planning to make an appointment after the new year to follow-up with Dr. Martinez. She states she would like to plan on completing some labs before her appointment. Notified patient I would follow-up with Dr. Martinez to see if any additional labs are needed at this time. No further questions at this time.

## 2021-01-15 DIAGNOSIS — Z23 NEED FOR VACCINATION: ICD-10-CM

## 2021-01-28 ENCOUNTER — IMMUNIZATION (OUTPATIENT)
Dept: FAMILY PLANNING/WOMEN'S HEALTH CLINIC | Facility: IMMUNIZATION CENTER | Age: 71
End: 2021-01-28
Attending: INTERNAL MEDICINE
Payer: COMMERCIAL

## 2021-01-28 DIAGNOSIS — Z23 ENCOUNTER FOR VACCINATION: Primary | ICD-10-CM

## 2021-01-28 DIAGNOSIS — Z23 NEED FOR VACCINATION: ICD-10-CM

## 2021-01-28 PROCEDURE — 0011A MODERNA SARS-COV-2 VACCINE: CPT

## 2021-01-28 PROCEDURE — 91301 MODERNA SARS-COV-2 VACCINE: CPT

## 2021-02-10 ENCOUNTER — HOSPITAL ENCOUNTER (OUTPATIENT)
Dept: LAB | Facility: MEDICAL CENTER | Age: 71
End: 2021-02-10
Attending: FAMILY MEDICINE
Payer: COMMERCIAL

## 2021-02-10 DIAGNOSIS — R71.8 ELEVATED HEMATOCRIT: ICD-10-CM

## 2021-02-10 DIAGNOSIS — R73.03 PREDIABETES: ICD-10-CM

## 2021-02-10 DIAGNOSIS — R73.01 IFG (IMPAIRED FASTING GLUCOSE): ICD-10-CM

## 2021-02-10 DIAGNOSIS — E78.5 HYPERLIPIDEMIA LDL GOAL <130: ICD-10-CM

## 2021-02-10 DIAGNOSIS — E55.9 VITAMIN D INSUFFICIENCY: ICD-10-CM

## 2021-02-10 LAB
25(OH)D3 SERPL-MCNC: 36 NG/ML (ref 30–100)
ALBUMIN SERPL BCP-MCNC: 3.9 G/DL (ref 3.2–4.9)
ALBUMIN/GLOB SERPL: 1.2 G/DL
ALP SERPL-CCNC: 74 U/L (ref 30–99)
ALT SERPL-CCNC: 11 U/L (ref 2–50)
ANION GAP SERPL CALC-SCNC: 6 MMOL/L (ref 7–16)
AST SERPL-CCNC: 11 U/L (ref 12–45)
BILIRUB SERPL-MCNC: 0.5 MG/DL (ref 0.1–1.5)
BUN SERPL-MCNC: 20 MG/DL (ref 8–22)
CALCIUM SERPL-MCNC: 9.4 MG/DL (ref 8.5–10.5)
CHLORIDE SERPL-SCNC: 102 MMOL/L (ref 96–112)
CHOLEST SERPL-MCNC: 196 MG/DL (ref 100–199)
CO2 SERPL-SCNC: 26 MMOL/L (ref 20–33)
CREAT SERPL-MCNC: 0.6 MG/DL (ref 0.5–1.4)
ERYTHROCYTE [DISTWIDTH] IN BLOOD BY AUTOMATED COUNT: 44 FL (ref 35.9–50)
EST. AVERAGE GLUCOSE BLD GHB EST-MCNC: 140 MG/DL
FASTING STATUS PATIENT QL REPORTED: NORMAL
GLOBULIN SER CALC-MCNC: 3.2 G/DL (ref 1.9–3.5)
GLUCOSE SERPL-MCNC: 112 MG/DL (ref 65–99)
HBA1C MFR BLD: 6.5 % (ref 0–5.6)
HCT VFR BLD AUTO: 48.9 % (ref 37–47)
HDLC SERPL-MCNC: 46 MG/DL
HGB BLD-MCNC: 15.7 G/DL (ref 12–16)
LDLC SERPL CALC-MCNC: 135 MG/DL
MCH RBC QN AUTO: 29.3 PG (ref 27–33)
MCHC RBC AUTO-ENTMCNC: 32.1 G/DL (ref 33.6–35)
MCV RBC AUTO: 91.2 FL (ref 81.4–97.8)
PLATELET # BLD AUTO: 162 K/UL (ref 164–446)
PMV BLD AUTO: 11.5 FL (ref 9–12.9)
POTASSIUM SERPL-SCNC: 4.3 MMOL/L (ref 3.6–5.5)
PROT SERPL-MCNC: 7.1 G/DL (ref 6–8.2)
RBC # BLD AUTO: 5.36 M/UL (ref 4.2–5.4)
SODIUM SERPL-SCNC: 134 MMOL/L (ref 135–145)
TRIGL SERPL-MCNC: 75 MG/DL (ref 0–149)
TSH SERPL DL<=0.005 MIU/L-ACNC: 1.58 UIU/ML (ref 0.38–5.33)
WBC # BLD AUTO: 7.2 K/UL (ref 4.8–10.8)

## 2021-02-10 PROCEDURE — 36415 COLL VENOUS BLD VENIPUNCTURE: CPT

## 2021-02-10 PROCEDURE — 80061 LIPID PANEL: CPT

## 2021-02-10 PROCEDURE — 83036 HEMOGLOBIN GLYCOSYLATED A1C: CPT

## 2021-02-10 PROCEDURE — 85027 COMPLETE CBC AUTOMATED: CPT

## 2021-02-10 PROCEDURE — 80053 COMPREHEN METABOLIC PANEL: CPT

## 2021-02-10 PROCEDURE — 82306 VITAMIN D 25 HYDROXY: CPT

## 2021-02-10 PROCEDURE — 84443 ASSAY THYROID STIM HORMONE: CPT

## 2021-02-17 ENCOUNTER — OFFICE VISIT (OUTPATIENT)
Dept: MEDICAL GROUP | Facility: IMAGING CENTER | Age: 71
End: 2021-02-17
Payer: COMMERCIAL

## 2021-02-17 VITALS
WEIGHT: 243 LBS | RESPIRATION RATE: 12 BRPM | SYSTOLIC BLOOD PRESSURE: 138 MMHG | BODY MASS INDEX: 36.83 KG/M2 | HEIGHT: 68 IN | OXYGEN SATURATION: 98 % | DIASTOLIC BLOOD PRESSURE: 88 MMHG | TEMPERATURE: 97.4 F | HEART RATE: 76 BPM

## 2021-02-17 DIAGNOSIS — Z82.49 FAMILY HISTORY OF CARDIOVASCULAR DISEASE: ICD-10-CM

## 2021-02-17 DIAGNOSIS — Z78.0 POSTMENOPAUSAL STATUS (AGE-RELATED) (NATURAL): ICD-10-CM

## 2021-02-17 DIAGNOSIS — E78.5 HYPERLIPIDEMIA LDL GOAL <130: ICD-10-CM

## 2021-02-17 DIAGNOSIS — E55.9 VITAMIN D INSUFFICIENCY: ICD-10-CM

## 2021-02-17 DIAGNOSIS — R71.8 ELEVATED HEMATOCRIT: ICD-10-CM

## 2021-02-17 DIAGNOSIS — Z12.31 ENCOUNTER FOR SCREENING MAMMOGRAM FOR BREAST CANCER: ICD-10-CM

## 2021-02-17 DIAGNOSIS — E66.9 OBESITY (BMI 30-39.9): ICD-10-CM

## 2021-02-17 DIAGNOSIS — Z71.9 HEALTH EDUCATION/COUNSELING: ICD-10-CM

## 2021-02-17 DIAGNOSIS — R73.03 PREDIABETES: ICD-10-CM

## 2021-02-17 PROCEDURE — 99214 OFFICE O/P EST MOD 30 MIN: CPT | Performed by: FAMILY MEDICINE

## 2021-02-17 ASSESSMENT — PAIN SCALES - GENERAL: PAINLEVEL: 2=MINIMAL-SLIGHT

## 2021-02-17 ASSESSMENT — FIBROSIS 4 INDEX: FIB4 SCORE: 1.43

## 2021-02-17 ASSESSMENT — PATIENT HEALTH QUESTIONNAIRE - PHQ9: CLINICAL INTERPRETATION OF PHQ2 SCORE: 0

## 2021-02-17 NOTE — PROGRESS NOTES
SUBJECTIVE:    Chief Complaint   Patient presents with   • Lab Results       HPI:     Oneyda Pratt is a 70 y.o. female with prediabetes, hyperlipidemia, history of melanoma in situ and family history of cardiovascular disease here for review of lab results.    Prediabetes-recent hemoglobin A1c increased to 6.5% but fasting glucose 112.  Patient has been taking care of her elderly mother and does know she needs to further work on her diet and exercise.  Is aware of what she needs to eat.  Recently bought an exercise bike and is using 5 days a week.  She is more sedentary at work.  She needs to walk more.  She is interested in joining the gym.  She will look into attaining a glucometer and glucose strips.  Does not desire medication therapy.  In her diet she does eat a lot of fruits and vegetables, however she does like to eat bread.  She does eat some meats.  Eat some pasta.  May eat more bread or pasta in the evening.    Hematocrit slightly increased-does not feel she snores at nighttime.  States she does not drink enough water and will work on this.  Declined overnight oximetry testing.    Hypercholesterolemia/hyperlipidemia- with family history of cardiovascular disease.  Her mother is currently 103 years old but has a history of cardiac bypass surgery.  States her father also had cardiovascular disease.  Total cholesterol improved from 214-196.  LDL from 146-135.    Vitamin D has been low in the past.  Recent labs 36.  Currently taking 5000 IU vitamin D daily.      HM:  Due for bone density.  She will plan to complete mammogram.  States about 10 years since her last Pap smear.  Discussed recommendations of completing Pap smear until 65 to 70 years of age.  Patient may consider scheduling for routine GYN exam in future.  Currently without symptoms.  Colonoscopy-need prior records.    ROS:  Denies any recent fevers or chills. No nausea or vomiting. No diarrhea. No chest pains or shortness of breath. No  "lower extremity edema.    Current Outpatient Medications on File Prior to Visit   Medication Sig Dispense Refill   • Multiple Vitamin (MULTIVITAMIN ADULT PO) Take  by mouth.     • Omega-3 Fatty Acids (OMEGA 3 500 PO) Take  by mouth.     • vitamin D (CHOLECALCIFEROL) 1000 UNIT Tab Take 5,000 Units by mouth every day.       No current facility-administered medications on file prior to visit.       Allergies   Allergen Reactions   • Seasonal        Patient Active Problem List    Diagnosis Date Noted   • Vitamin D insufficiency 02/18/2021   • Prediabetes 07/17/2020   • Hyperlipidemia LDL goal <130 07/17/2020   • IFG (impaired fasting glucose) 07/17/2020   • Arthralgia of multiple joints 08/14/2019   • Obesity (BMI 30-39.9) 08/14/2019   • History of melanoma in situ 08/14/2019   • Family history of cardiovascular disease 08/14/2019       Past Medical History:   Diagnosis Date   • 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, excised and followed by [facility]       OBJECTIVE:   /88   Pulse 76   Temp 36.3 °C (97.4 °F)   Resp 12   Ht 1.727 m (5' 8\")   Wt 110 kg (243 lb)   SpO2 98%   BMI 36.95 kg/m²   General: Well-developed well-nourished female, no acute distress  Neck: supple, no lymphadenopathy- cervical or supraclavicular, no thyromegaly  Cardiovascular: regular rate and rhythm, no murmurs, gallops, rubs  Lungs: clear to auscultation bilaterally, no wheezes, crackles, or rhonchi  Abdomen: +bowel sounds, soft, nontender, nondistended, no rebound, no guarding, no hepatosplenomegaly  Extremities: no cyanosis, clubbing, edema  Skin: Warm and dry  Psych: appropriate mood and affect     Ref. Range 2/10/2021 07:28   WBC Latest Ref Range: 4.8 - 10.8 K/uL 7.2   RBC Latest Ref Range: 4.20 - 5.40 M/uL 5.36   Hemoglobin Latest Ref Range: 12.0 - 16.0 g/dL 15.7   Hematocrit Latest Ref Range: 37.0 - 47.0 % 48.9 (H)   MCV Latest Ref Range: 81.4 - 97.8 fL 91.2   MCH Latest Ref Range: 27.0 - 33.0 pg 29.3   MCHC Latest " Ref Range: 33.6 - 35.0 g/dL 32.1 (L)   RDW Latest Ref Range: 35.9 - 50.0 fL 44.0   Platelet Count Latest Ref Range: 164 - 446 K/uL 162 (L)   MPV Latest Ref Range: 9.0 - 12.9 fL 11.5   Sodium Latest Ref Range: 135 - 145 mmol/L 134 (L)   Potassium Latest Ref Range: 3.6 - 5.5 mmol/L 4.3   Chloride Latest Ref Range: 96 - 112 mmol/L 102   Co2 Latest Ref Range: 20 - 33 mmol/L 26   Anion Gap Latest Ref Range: 7.0 - 16.0  6.0 (L)   Glucose Latest Ref Range: 65 - 99 mg/dL 112 (H)   Bun Latest Ref Range: 8 - 22 mg/dL 20   Creatinine Latest Ref Range: 0.50 - 1.40 mg/dL 0.60   GFR If  Latest Ref Range: >60 mL/min/1.73 m 2 >60   GFR If Non  Latest Ref Range: >60 mL/min/1.73 m 2 >60   Calcium Latest Ref Range: 8.5 - 10.5 mg/dL 9.4   AST(SGOT) Latest Ref Range: 12 - 45 U/L 11 (L)   ALT(SGPT) Latest Ref Range: 2 - 50 U/L 11   Alkaline Phosphatase Latest Ref Range: 30 - 99 U/L 74   Total Bilirubin Latest Ref Range: 0.1 - 1.5 mg/dL 0.5   Albumin Latest Ref Range: 3.2 - 4.9 g/dL 3.9   Total Protein Latest Ref Range: 6.0 - 8.2 g/dL 7.1   Globulin Latest Ref Range: 1.9 - 3.5 g/dL 3.2   A-G Ratio Latest Units: g/dL 1.2   Glycohemoglobin Latest Ref Range: 0.0 - 5.6 % 6.5 (H)   Estim. Avg Glu Latest Units: mg/dL 140   Fasting Status Unknown Fasting   Cholesterol,Tot Latest Ref Range: 100 - 199 mg/dL 196   Triglycerides Latest Ref Range: 0 - 149 mg/dL 75   HDL Latest Ref Range: >=40 mg/dL 46   LDL Latest Ref Range: <100 mg/dL 135 (H)   25-Hydroxy   Vitamin D 25 Latest Ref Range: 30 - 100 ng/mL 36   TSH Latest Ref Range: 0.380 - 5.330 uIU/mL 1.580     ASSESSMENT/PLAN:    70 y.o.female with prediabetes, hyperlipidemia, vitamin D insufficiency, history of melanoma in situ and family history of cardiovascular disease.    1. Prediabetes  Comp Metabolic Panel    HEMOGLOBIN A1C   2. Elevated hematocrit  CBC WITHOUT DIFFERENTIAL   3. Hyperlipidemia LDL goal <130  Lipid Profile    CT-CARDIAC SCORING   4. Family  history of cardiovascular disease  CT-CARDIAC SCORING   5. Vitamin D insufficiency     6. Postmenopausal status (age-related) (natural)  DS-BONE DENSITY STUDY (DEXA)   7. Encounter for screening mammogram for breast cancer  MA-SCREENING MAMMO BILAT W/TOMOSYNTHESIS W/CAD   8. Health education/counseling     9. Obesity (BMI 30-39.9)     -Prediabetes-slight increase in hemoglobin A1c to 6.5%, fasting glucose 112.  Has not met criteria for diagnosis of diabetes yet with 2 lab findings on one lab draw or to findings on 2 separate lab draws.  Declined medication therapy which was discussed.  She is motivated to further work on lifestyle with diet and exercise.  Reviewed recommendations for foods to avoid and foods to increase for diabetic diet.  May consider diabetes prevention class.  Recommend use of glucometer to measure glucose levels intermittently.  -Elevated hematocrit-mild.  Patient will work on hydration.  She declined overnight oximetry test today.  Will monitor at this time.  -Hyperlipidemia-prior goal of less than 130 but encouraged patient to get closer to less than 100 if possible.  Reviewed low-cholesterol, high-fiber diet and routine exercise.  Discussed recommendations for omega-3 and diet.  -Family history of cardiovascular disease-discussed recommendations for CT cardiac scoring for evaluation and consideration of medication therapy.  -Vitamin D insufficiency-improved on current supplement.  Continue current supplement, with addition of 1 extra dose during the week if possible.  We will continue to monitor.    -Postmenopausal-patient will plan to complete bone density scan, no prior scan.   -Healthcare maintenance recommendations as above.  Reviewed recommendations for routine cardiovascular exercise as well as strength training, stretching and balance.  Counseled further on dietary recommendations.  -Obesity- Patient's body mass index is 36.95 kg/m². Exercise and nutrition counseling were performed at  this visit.    Follow-up in 3 months after lab work is complete.      This medical record contains text that has been entered with the assistance of computer voice recognition and dictation software.  Therefore, it may contain unintended errors in text, spelling, punctuation, or grammar.

## 2021-02-18 DIAGNOSIS — R73.03 PREDIABETES: ICD-10-CM

## 2021-02-18 PROBLEM — E55.9 VITAMIN D INSUFFICIENCY: Status: ACTIVE | Noted: 2021-02-18

## 2021-02-23 ENCOUNTER — TELEPHONE (OUTPATIENT)
Dept: MEDICAL GROUP | Facility: IMAGING CENTER | Age: 71
End: 2021-02-23

## 2021-02-23 DIAGNOSIS — R73.03 PREDIABETES: ICD-10-CM

## 2021-02-23 NOTE — TELEPHONE ENCOUNTER
Pharmacy called regarding prescription sent in on 2/18 for misc. Devices/supplies. Was told the patient is on Medicare part B and the prescription order needs to include relevant ICD-10 codes as well as the specific devices, quantities, and instructions for the supplies on the order.

## 2021-02-23 NOTE — TELEPHONE ENCOUNTER
Verified with patient that she is on [employer] insurance and Medicare part A, I called the pharmacy to let them know her primary insurance and gave them her HT insurance number and to ask if we still need to do the detailed prescription for Medicare standards. The pharmacy told me that they now didn't have the prescription we originally sent 2/18/21 and asked for us to send it again for them to process under her [employer] insurance.

## 2021-02-26 ENCOUNTER — HOSPITAL ENCOUNTER (OUTPATIENT)
Dept: RADIOLOGY | Facility: MEDICAL CENTER | Age: 71
End: 2021-02-26
Attending: FAMILY MEDICINE
Payer: COMMERCIAL

## 2021-02-26 DIAGNOSIS — Z82.49 FAMILY HISTORY OF CARDIOVASCULAR DISEASE: ICD-10-CM

## 2021-02-26 DIAGNOSIS — E78.5 HYPERLIPIDEMIA LDL GOAL <130: ICD-10-CM

## 2021-02-26 PROCEDURE — 4410556 CT-CARDIAC SCORING (SELF PAY ONLY)

## 2021-03-05 ENCOUNTER — IMMUNIZATION (OUTPATIENT)
Dept: FAMILY PLANNING/WOMEN'S HEALTH CLINIC | Facility: IMMUNIZATION CENTER | Age: 71
End: 2021-03-05
Attending: INTERNAL MEDICINE
Payer: COMMERCIAL

## 2021-03-05 DIAGNOSIS — Z23 ENCOUNTER FOR VACCINATION: Primary | ICD-10-CM

## 2021-03-05 PROCEDURE — 91301 MODERNA SARS-COV-2 VACCINE: CPT | Performed by: INTERNAL MEDICINE

## 2021-03-05 PROCEDURE — 0012A MODERNA SARS-COV-2 VACCINE: CPT | Performed by: INTERNAL MEDICINE

## 2021-05-05 ENCOUNTER — HOSPITAL ENCOUNTER (OUTPATIENT)
Dept: LAB | Facility: MEDICAL CENTER | Age: 71
End: 2021-05-05
Attending: FAMILY MEDICINE
Payer: COMMERCIAL

## 2021-05-05 DIAGNOSIS — R73.03 PREDIABETES: ICD-10-CM

## 2021-05-05 DIAGNOSIS — E78.5 HYPERLIPIDEMIA LDL GOAL <130: ICD-10-CM

## 2021-05-05 DIAGNOSIS — R71.8 ELEVATED HEMATOCRIT: ICD-10-CM

## 2021-05-05 LAB
ALBUMIN SERPL BCP-MCNC: 3.9 G/DL (ref 3.2–4.9)
ALBUMIN/GLOB SERPL: 1.3 G/DL
ALP SERPL-CCNC: 74 U/L (ref 30–99)
ALT SERPL-CCNC: 9 U/L (ref 2–50)
ANION GAP SERPL CALC-SCNC: 8 MMOL/L (ref 7–16)
AST SERPL-CCNC: 7 U/L (ref 12–45)
BILIRUB SERPL-MCNC: 0.6 MG/DL (ref 0.1–1.5)
BUN SERPL-MCNC: 20 MG/DL (ref 8–22)
CALCIUM SERPL-MCNC: 9 MG/DL (ref 8.5–10.5)
CHLORIDE SERPL-SCNC: 105 MMOL/L (ref 96–112)
CHOLEST SERPL-MCNC: 191 MG/DL (ref 100–199)
CO2 SERPL-SCNC: 27 MMOL/L (ref 20–33)
CREAT SERPL-MCNC: 0.62 MG/DL (ref 0.5–1.4)
ERYTHROCYTE [DISTWIDTH] IN BLOOD BY AUTOMATED COUNT: 43.2 FL (ref 35.9–50)
EST. AVERAGE GLUCOSE BLD GHB EST-MCNC: 123 MG/DL
FASTING STATUS PATIENT QL REPORTED: NORMAL
GLOBULIN SER CALC-MCNC: 3.1 G/DL (ref 1.9–3.5)
GLUCOSE SERPL-MCNC: 118 MG/DL (ref 65–99)
HBA1C MFR BLD: 5.9 % (ref 4–5.6)
HCT VFR BLD AUTO: 50.1 % (ref 37–47)
HDLC SERPL-MCNC: 43 MG/DL
HGB BLD-MCNC: 16.1 G/DL (ref 12–16)
LDLC SERPL CALC-MCNC: 131 MG/DL
MCH RBC QN AUTO: 29.2 PG (ref 27–33)
MCHC RBC AUTO-ENTMCNC: 32.1 G/DL (ref 33.6–35)
MCV RBC AUTO: 90.8 FL (ref 81.4–97.8)
PLATELET # BLD AUTO: 177 K/UL (ref 164–446)
PMV BLD AUTO: 12.1 FL (ref 9–12.9)
POTASSIUM SERPL-SCNC: 4.2 MMOL/L (ref 3.6–5.5)
PROT SERPL-MCNC: 7 G/DL (ref 6–8.2)
RBC # BLD AUTO: 5.52 M/UL (ref 4.2–5.4)
SODIUM SERPL-SCNC: 140 MMOL/L (ref 135–145)
TRIGL SERPL-MCNC: 83 MG/DL (ref 0–149)
WBC # BLD AUTO: 7.1 K/UL (ref 4.8–10.8)

## 2021-05-05 PROCEDURE — 80053 COMPREHEN METABOLIC PANEL: CPT

## 2021-05-05 PROCEDURE — 80061 LIPID PANEL: CPT

## 2021-05-05 PROCEDURE — 83036 HEMOGLOBIN GLYCOSYLATED A1C: CPT

## 2021-05-05 PROCEDURE — 36415 COLL VENOUS BLD VENIPUNCTURE: CPT

## 2021-05-05 PROCEDURE — 85027 COMPLETE CBC AUTOMATED: CPT

## 2021-05-12 ENCOUNTER — OFFICE VISIT (OUTPATIENT)
Dept: MEDICAL GROUP | Facility: IMAGING CENTER | Age: 71
End: 2021-05-12
Payer: COMMERCIAL

## 2021-05-12 VITALS
TEMPERATURE: 98.7 F | DIASTOLIC BLOOD PRESSURE: 88 MMHG | WEIGHT: 236 LBS | OXYGEN SATURATION: 95 % | SYSTOLIC BLOOD PRESSURE: 144 MMHG | HEART RATE: 82 BPM | HEIGHT: 68 IN | RESPIRATION RATE: 12 BRPM | BODY MASS INDEX: 35.77 KG/M2

## 2021-05-12 DIAGNOSIS — M25.50 ARTHRALGIA OF MULTIPLE JOINTS: ICD-10-CM

## 2021-05-12 DIAGNOSIS — E55.9 VITAMIN D INSUFFICIENCY: ICD-10-CM

## 2021-05-12 DIAGNOSIS — E78.5 HYPERLIPIDEMIA LDL GOAL <130: ICD-10-CM

## 2021-05-12 DIAGNOSIS — D75.1 POLYCYTHEMIA: ICD-10-CM

## 2021-05-12 DIAGNOSIS — R73.03 PREDIABETES: ICD-10-CM

## 2021-05-12 DIAGNOSIS — R89.9 ABNORMAL LABORATORY TEST RESULT: ICD-10-CM

## 2021-05-12 DIAGNOSIS — Z82.49 FAMILY HISTORY OF CARDIOVASCULAR DISEASE: ICD-10-CM

## 2021-05-12 DIAGNOSIS — E66.9 OBESITY (BMI 30-39.9): ICD-10-CM

## 2021-05-12 PROCEDURE — 99214 OFFICE O/P EST MOD 30 MIN: CPT | Performed by: FAMILY MEDICINE

## 2021-05-12 RX ORDER — BLOOD-GLUCOSE METER
KIT MISCELLANEOUS
COMMUNITY
Start: 2021-02-24 | End: 2022-10-20

## 2021-05-12 RX ORDER — LANCETS 28 GAUGE
EACH MISCELLANEOUS
COMMUNITY
Start: 2021-02-24 | End: 2024-01-18 | Stop reason: SDUPTHER

## 2021-05-12 RX ORDER — BLOOD-GLUCOSE METER
KIT MISCELLANEOUS
COMMUNITY
Start: 2021-02-24 | End: 2024-01-18 | Stop reason: SDUPTHER

## 2021-05-12 ASSESSMENT — FIBROSIS 4 INDEX: FIB4 SCORE: 0.92

## 2021-05-12 ASSESSMENT — PAIN SCALES - GENERAL: PAINLEVEL: NO PAIN

## 2021-05-12 NOTE — PROGRESS NOTES
SUBJECTIVE:    Chief Complaint   Patient presents with   • Lab Results       HPI:     Oneyda Pratt is a 70 y.o. female with prediabetes, hyperlipidemia, vitamin D insufficiency and family history of cardiovascular disease here for review of labs and imaging results.    Prediabetes -improvement in hemoglobin A1c from 6.5% to 5.9%  States she has been doing more plant-based diet and lifestyle changes.  Has lost 7 pounds continues to work on weight loss.  She is planning to retire at the end of this year, will have more time for self-care.  Body mass index is 35.88 kg/m².   Also with some intermittent joint pains with dietary changes.    Hyperlipidemia-some very slight decrease in lipids.  Does not desire to start statin therapy at this time which was reviewed with patient.  Family history of cardiovascular disease.  Her mother is currently 103 years old and states she had bypass surgery in her 80s.  Patient continues to help with care of her mother.    Elevated hemoglobin/hematocrit-not sure if she was going.  She does have some allergies.  Does not desire to complete a full sleep test but want to complete overnight oximetry at this time.  Could work on drinking water more regularly.    Further decrease in AST noted.  Patient without history of liver issues.  No routine alcohol intake.  No abdominal pain symptoms.    Health maintenance:   Has orders for DEXA scanning and mammogram in system to schedule.  Consider colonoscopy versus fit test.  Routine vaccinations recommended in future.      ROS:  No recent fevers or chills. No nausea or vomiting. No diarrhea. No chest pains or shortness of breath. No lower extremity edema.    Current Outpatient Medications on File Prior to Visit   Medication Sig Dispense Refill   • Multiple Vitamin (MULTIVITAMIN ADULT PO) Take  by mouth.     • Omega-3 Fatty Acids (OMEGA 3 500 PO) Take  by mouth.     • vitamin D (CHOLECALCIFEROL) 1000 UNIT Tab Take 5,000 Units by mouth every  "day.       No current facility-administered medications on file prior to visit.       Allergies   Allergen Reactions   • Seasonal        Patient Active Problem List    Diagnosis Date Noted   • Vitamin D insufficiency 02/18/2021   • Prediabetes 07/17/2020   • Hyperlipidemia LDL goal <130 07/17/2020   • IFG (impaired fasting glucose) 07/17/2020   • Arthralgia of multiple joints 08/14/2019   • Obesity (BMI 30-39.9) 08/14/2019   • History of melanoma in situ 08/14/2019   • Family history of cardiovascular disease 08/14/2019       Past Medical History:   Diagnosis Date   • 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, excised and followed by [facility]       OBJECTIVE:   /88   Pulse 82   Temp 37.1 °C (98.7 °F)   Resp 12   Ht 1.727 m (5' 8\")   Wt 107 kg (236 lb)   SpO2 95%   BMI 35.88 kg/m²   General: Well-developed well-nourished female, no acute distress  Neck: supple, no lymphadenopathy- cervical or supraclavicular, no thyromegaly  Cardiovascular: regular rate and rhythm, no murmurs, gallops, rubs  Lungs: clear to auscultation bilaterally, no wheezes, crackles, or rhonchi  Abdomen: +bowel sounds, soft, obese, nontender, nondistended, no rebound, no guarding, no hepatosplenomegaly  Extremities: no cyanosis, clubbing, edema  Skin: Warm and dry  Psych: appropriate mood and affect     Ref. Range 5/5/2021 08:38   WBC Latest Ref Range: 4.8 - 10.8 K/uL 7.1   RBC Latest Ref Range: 4.20 - 5.40 M/uL 5.52 (H)   Hemoglobin Latest Ref Range: 12.0 - 16.0 g/dL 16.1 (H)   Hematocrit Latest Ref Range: 37.0 - 47.0 % 50.1 (H)   MCV Latest Ref Range: 81.4 - 97.8 fL 90.8   MCH Latest Ref Range: 27.0 - 33.0 pg 29.2   MCHC Latest Ref Range: 33.6 - 35.0 g/dL 32.1 (L)   RDW Latest Ref Range: 35.9 - 50.0 fL 43.2   Platelet Count Latest Ref Range: 164 - 446 K/uL 177   MPV Latest Ref Range: 9.0 - 12.9 fL 12.1   Sodium Latest Ref Range: 135 - 145 mmol/L 140   Potassium Latest Ref Range: 3.6 - 5.5 mmol/L 4.2   Chloride Latest Ref " Range: 96 - 112 mmol/L 105   Co2 Latest Ref Range: 20 - 33 mmol/L 27   Anion Gap Latest Ref Range: 7.0 - 16.0  8.0   Glucose Latest Ref Range: 65 - 99 mg/dL 118 (H)   Bun Latest Ref Range: 8 - 22 mg/dL 20   Creatinine Latest Ref Range: 0.50 - 1.40 mg/dL 0.62   GFR If  Latest Ref Range: >60 mL/min/1.73 m 2 >60   GFR If Non  Latest Ref Range: >60 mL/min/1.73 m 2 >60   Calcium Latest Ref Range: 8.5 - 10.5 mg/dL 9.0   AST(SGOT) Latest Ref Range: 12 - 45 U/L 7 (L)   ALT(SGPT) Latest Ref Range: 2 - 50 U/L 9   Alkaline Phosphatase Latest Ref Range: 30 - 99 U/L 74   Total Bilirubin Latest Ref Range: 0.1 - 1.5 mg/dL 0.6   Albumin Latest Ref Range: 3.2 - 4.9 g/dL 3.9   Total Protein Latest Ref Range: 6.0 - 8.2 g/dL 7.0   Globulin Latest Ref Range: 1.9 - 3.5 g/dL 3.1   A-G Ratio Latest Units: g/dL 1.3   Glycohemoglobin Latest Ref Range: 4.0 - 5.6 % 5.9 (H)   Estim. Avg Glu Latest Units: mg/dL 123   Fasting Status Unknown Fasting   Cholesterol,Tot Latest Ref Range: 100 - 199 mg/dL 191   Triglycerides Latest Ref Range: 0 - 149 mg/dL 83   HDL Latest Ref Range: >=40 mg/dL 43   LDL Latest Ref Range: <100 mg/dL 131 (H)       ASSESSMENT/PLAN:    70 y.o.female with prediabetes, hyperlipidemia, vitamin D insufficiency and family history of cardiovascular disease.    1. Prediabetes     2. Obesity (BMI 30-39.9)     3. Arthralgia of multiple joints     4. Hyperlipidemia LDL goal <130     5. Family history of cardiovascular disease     6. Polycythemia  CBC WITH DIFFERENTIAL   7. Vitamin D insufficiency  VITAMIN D,25 HYDROXY   8. Abnormal laboratory test result  US-RUQ     -Prediabetes-improvement in hemoglobin A1c noted.  Continue to pursue current dietary changes and routine exercise.  Patient does have glucometer to use at home intermittently as needed.  -Obesity -Patient's body mass index is 35.88 kg/m². Exercise and nutrition counseling were performed at this visit.  Continue to pursue current dietary  and lifestyle changes.  Recommend routine exercise.  -Arthralgia-stable.  Some improvement with dietary changes.  Monitor.  -Hyperlipidemia/family history of cardiovascular disease-discussed recommendations for start of statin therapy which patient declined at this time.  Patient will consider use of red yeast rice 600 mg start twice daily as tolerated.  Reviewed potential side effects of supplement therapy.  Plan to recheck labs in 4 to 6 months.  -Polycythemia-slight increase in H/H.  Discussed with patient possible etiologies and effects of polycythemia.  Will obtain overnight oximetry test for further evaluation.  Patient recommended to hydrate adequately.  -Vitamin D insufficiency-we will monitor labs.  -Abnormal lab result-repeat labs show decrease in AST levels.    Will assess further with imaging as above.  Continue to monitor blood pressure.    Follow-up in 1 month as needed after lab and imaging complete.  Otherwise follow-up in 4 to 6 months after routine prediabetes/hyperlipidemia labs complete.  Patient will need future lab order.    This medical record contains text that has been entered with the assistance of computer voice recognition and dictation software.  Therefore, it may contain unintended errors in text, spelling, punctuation, or grammar.

## 2021-11-05 ENCOUNTER — TELEPHONE (OUTPATIENT)
Dept: HEALTH INFORMATION MANAGEMENT | Facility: OTHER | Age: 71
End: 2021-11-05

## 2021-11-05 NOTE — TELEPHONE ENCOUNTER
Outcome: Patient stated she is effective with [employer] as of 11/1/2021 however, HC does not show active coverage. Patient stated that [facility] sent her letter stating they need proof of Mcr part A and B and so she stated she was going to go in office to get it sorted out. Notified mbr we can call her at a later time so we can determine whether her insurance will be effective prior to 01/1/2022 so we can either schedule SAIRA or QSHA.     Please transfer to Patient Outreach Team at [phone] when patient returns call.    Attempt # 1

## 2022-04-08 ENCOUNTER — PATIENT MESSAGE (OUTPATIENT)
Dept: HEALTH INFORMATION MANAGEMENT | Facility: OTHER | Age: 72
End: 2022-04-08

## 2022-05-12 ENCOUNTER — HOSPITAL ENCOUNTER (OUTPATIENT)
Dept: RADIOLOGY | Facility: MEDICAL CENTER | Age: 72
End: 2022-05-12
Attending: FAMILY MEDICINE
Payer: MEDICARE

## 2022-05-12 DIAGNOSIS — Z12.31 VISIT FOR SCREENING MAMMOGRAM: ICD-10-CM

## 2022-05-12 PROCEDURE — 77063 BREAST TOMOSYNTHESIS BI: CPT

## 2022-05-20 ENCOUNTER — HOSPITAL ENCOUNTER (OUTPATIENT)
Dept: RADIOLOGY | Facility: MEDICAL CENTER | Age: 72
End: 2022-05-20
Attending: FAMILY MEDICINE
Payer: MEDICARE

## 2022-05-20 DIAGNOSIS — R92.8 ABNORMAL MAMMOGRAM: ICD-10-CM

## 2022-05-20 PROCEDURE — 76642 ULTRASOUND BREAST LIMITED: CPT | Mod: LT

## 2022-05-27 ENCOUNTER — HOSPITAL ENCOUNTER (OUTPATIENT)
Dept: RADIOLOGY | Facility: MEDICAL CENTER | Age: 72
End: 2022-05-27
Attending: FAMILY MEDICINE
Payer: MEDICARE

## 2022-05-27 DIAGNOSIS — Z78.0 POSTMENOPAUSAL: ICD-10-CM

## 2022-05-27 PROCEDURE — 77080 DXA BONE DENSITY AXIAL: CPT

## 2022-07-29 ENCOUNTER — TELEPHONE (OUTPATIENT)
Dept: HEALTH INFORMATION MANAGEMENT | Facility: OTHER | Age: 72
End: 2022-07-29

## 2022-09-24 ENCOUNTER — PHARMACY VISIT (OUTPATIENT)
Dept: PHARMACY | Facility: MEDICAL CENTER | Age: 72
End: 2022-09-24
Payer: COMMERCIAL

## 2022-09-24 PROCEDURE — RXMED WILLOW AMBULATORY MEDICATION CHARGE: Performed by: PHARMACIST

## 2022-10-04 DIAGNOSIS — R73.01 IFG (IMPAIRED FASTING GLUCOSE): ICD-10-CM

## 2022-10-04 PROBLEM — C43.4 MALIGNANT MELANOMA OF SCALP (HCC): Status: ACTIVE | Noted: 2017-02-14

## 2022-10-04 PROBLEM — C43.30 MALIGNANT MELANOMA OF SKIN OF FACE (HCC): Status: ACTIVE | Noted: 2017-01-23

## 2022-10-04 NOTE — PROGRESS NOTES
Patient participated in the [facility] club event.  Her DM foot exam was normal

## 2022-10-13 ENCOUNTER — HOSPITAL ENCOUNTER (OUTPATIENT)
Dept: LAB | Facility: MEDICAL CENTER | Age: 72
End: 2022-10-13
Attending: FAMILY MEDICINE
Payer: MEDICARE

## 2022-10-13 DIAGNOSIS — E55.9 VITAMIN D INSUFFICIENCY: ICD-10-CM

## 2022-10-13 DIAGNOSIS — R73.03 PREDIABETES: ICD-10-CM

## 2022-10-13 DIAGNOSIS — E78.5 HYPERLIPIDEMIA LDL GOAL <130: ICD-10-CM

## 2022-10-13 DIAGNOSIS — D75.1 POLYCYTHEMIA: ICD-10-CM

## 2022-10-13 LAB
25(OH)D3 SERPL-MCNC: 35 NG/ML (ref 30–100)
ALBUMIN SERPL BCP-MCNC: 4.2 G/DL (ref 3.2–4.9)
ALBUMIN/GLOB SERPL: 1.4 G/DL
ALP SERPL-CCNC: 75 U/L (ref 30–99)
ALT SERPL-CCNC: 10 U/L (ref 2–50)
ANION GAP SERPL CALC-SCNC: 8 MMOL/L (ref 7–16)
AST SERPL-CCNC: 10 U/L (ref 12–45)
BASOPHILS # BLD AUTO: 0.8 % (ref 0–1.8)
BASOPHILS # BLD: 0.05 K/UL (ref 0–0.12)
BILIRUB SERPL-MCNC: 0.7 MG/DL (ref 0.1–1.5)
BUN SERPL-MCNC: 16 MG/DL (ref 8–22)
CALCIUM SERPL-MCNC: 9.3 MG/DL (ref 8.5–10.5)
CHLORIDE SERPL-SCNC: 105 MMOL/L (ref 96–112)
CHOLEST SERPL-MCNC: 223 MG/DL (ref 100–199)
CO2 SERPL-SCNC: 28 MMOL/L (ref 20–33)
CREAT SERPL-MCNC: 0.64 MG/DL (ref 0.5–1.4)
EOSINOPHIL # BLD AUTO: 0.19 K/UL (ref 0–0.51)
EOSINOPHIL NFR BLD: 3.1 % (ref 0–6.9)
ERYTHROCYTE [DISTWIDTH] IN BLOOD BY AUTOMATED COUNT: 43 FL (ref 35.9–50)
EST. AVERAGE GLUCOSE BLD GHB EST-MCNC: 134 MG/DL
FASTING STATUS PATIENT QL REPORTED: NORMAL
GFR SERPLBLD CREATININE-BSD FMLA CKD-EPI: 94 ML/MIN/1.73 M 2
GLOBULIN SER CALC-MCNC: 2.9 G/DL (ref 1.9–3.5)
GLUCOSE SERPL-MCNC: 91 MG/DL (ref 65–99)
HBA1C MFR BLD: 6.3 % (ref 4–5.6)
HCT VFR BLD AUTO: 50 % (ref 37–47)
HDLC SERPL-MCNC: 48 MG/DL
HGB BLD-MCNC: 16.5 G/DL (ref 12–16)
IMM GRANULOCYTES # BLD AUTO: 0.02 K/UL (ref 0–0.11)
IMM GRANULOCYTES NFR BLD AUTO: 0.3 % (ref 0–0.9)
LDLC SERPL CALC-MCNC: 155 MG/DL
LYMPHOCYTES # BLD AUTO: 1.71 K/UL (ref 1–4.8)
LYMPHOCYTES NFR BLD: 28.3 % (ref 22–41)
MCH RBC QN AUTO: 29.8 PG (ref 27–33)
MCHC RBC AUTO-ENTMCNC: 33 G/DL (ref 33.6–35)
MCV RBC AUTO: 90.3 FL (ref 81.4–97.8)
MONOCYTES # BLD AUTO: 0.57 K/UL (ref 0–0.85)
MONOCYTES NFR BLD AUTO: 9.4 % (ref 0–13.4)
NEUTROPHILS # BLD AUTO: 3.5 K/UL (ref 2–7.15)
NEUTROPHILS NFR BLD: 58.1 % (ref 44–72)
NRBC # BLD AUTO: 0 K/UL
NRBC BLD-RTO: 0 /100 WBC
PLATELET # BLD AUTO: 183 K/UL (ref 164–446)
PMV BLD AUTO: 11.1 FL (ref 9–12.9)
POTASSIUM SERPL-SCNC: 4.2 MMOL/L (ref 3.6–5.5)
PROT SERPL-MCNC: 7.1 G/DL (ref 6–8.2)
RBC # BLD AUTO: 5.54 M/UL (ref 4.2–5.4)
SODIUM SERPL-SCNC: 141 MMOL/L (ref 135–145)
TRIGL SERPL-MCNC: 99 MG/DL (ref 0–149)
TSH SERPL DL<=0.005 MIU/L-ACNC: 2.32 UIU/ML (ref 0.38–5.33)
WBC # BLD AUTO: 6 K/UL (ref 4.8–10.8)

## 2022-10-13 PROCEDURE — 83036 HEMOGLOBIN GLYCOSYLATED A1C: CPT

## 2022-10-13 PROCEDURE — 36415 COLL VENOUS BLD VENIPUNCTURE: CPT

## 2022-10-13 PROCEDURE — 80061 LIPID PANEL: CPT

## 2022-10-13 PROCEDURE — 84443 ASSAY THYROID STIM HORMONE: CPT

## 2022-10-13 PROCEDURE — 82306 VITAMIN D 25 HYDROXY: CPT

## 2022-10-13 PROCEDURE — 85025 COMPLETE CBC W/AUTO DIFF WBC: CPT

## 2022-10-13 PROCEDURE — 80053 COMPREHEN METABOLIC PANEL: CPT

## 2022-10-17 SDOH — HEALTH STABILITY: PHYSICAL HEALTH: ON AVERAGE, HOW MANY MINUTES DO YOU ENGAGE IN EXERCISE AT THIS LEVEL?: 40 MIN

## 2022-10-17 SDOH — ECONOMIC STABILITY: HOUSING INSECURITY
IN THE LAST 12 MONTHS, WAS THERE A TIME WHEN YOU DID NOT HAVE A STEADY PLACE TO SLEEP OR SLEPT IN A SHELTER (INCLUDING NOW)?: NO

## 2022-10-17 SDOH — ECONOMIC STABILITY: FOOD INSECURITY: WITHIN THE PAST 12 MONTHS, THE FOOD YOU BOUGHT JUST DIDN'T LAST AND YOU DIDN'T HAVE MONEY TO GET MORE.: NEVER TRUE

## 2022-10-17 SDOH — ECONOMIC STABILITY: HOUSING INSECURITY: IN THE LAST 12 MONTHS, HOW MANY PLACES HAVE YOU LIVED?: 1

## 2022-10-17 SDOH — ECONOMIC STABILITY: INCOME INSECURITY: HOW HARD IS IT FOR YOU TO PAY FOR THE VERY BASICS LIKE FOOD, HOUSING, MEDICAL CARE, AND HEATING?: NOT VERY HARD

## 2022-10-17 SDOH — ECONOMIC STABILITY: INCOME INSECURITY: IN THE LAST 12 MONTHS, WAS THERE A TIME WHEN YOU WERE NOT ABLE TO PAY THE MORTGAGE OR RENT ON TIME?: NO

## 2022-10-17 SDOH — ECONOMIC STABILITY: TRANSPORTATION INSECURITY
IN THE PAST 12 MONTHS, HAS LACK OF TRANSPORTATION KEPT YOU FROM MEETINGS, WORK, OR FROM GETTING THINGS NEEDED FOR DAILY LIVING?: NO

## 2022-10-17 SDOH — ECONOMIC STABILITY: FOOD INSECURITY: WITHIN THE PAST 12 MONTHS, YOU WORRIED THAT YOUR FOOD WOULD RUN OUT BEFORE YOU GOT MONEY TO BUY MORE.: NEVER TRUE

## 2022-10-17 SDOH — HEALTH STABILITY: PHYSICAL HEALTH: ON AVERAGE, HOW MANY DAYS PER WEEK DO YOU ENGAGE IN MODERATE TO STRENUOUS EXERCISE (LIKE A BRISK WALK)?: 7 DAYS

## 2022-10-17 SDOH — ECONOMIC STABILITY: TRANSPORTATION INSECURITY
IN THE PAST 12 MONTHS, HAS THE LACK OF TRANSPORTATION KEPT YOU FROM MEDICAL APPOINTMENTS OR FROM GETTING MEDICATIONS?: NO

## 2022-10-17 SDOH — HEALTH STABILITY: MENTAL HEALTH
STRESS IS WHEN SOMEONE FEELS TENSE, NERVOUS, ANXIOUS, OR CAN'T SLEEP AT NIGHT BECAUSE THEIR MIND IS TROUBLED. HOW STRESSED ARE YOU?: ONLY A LITTLE

## 2022-10-17 SDOH — ECONOMIC STABILITY: TRANSPORTATION INSECURITY
IN THE PAST 12 MONTHS, HAS LACK OF RELIABLE TRANSPORTATION KEPT YOU FROM MEDICAL APPOINTMENTS, MEETINGS, WORK OR FROM GETTING THINGS NEEDED FOR DAILY LIVING?: NO

## 2022-10-17 ASSESSMENT — SOCIAL DETERMINANTS OF HEALTH (SDOH)
IN A TYPICAL WEEK, HOW MANY TIMES DO YOU TALK ON THE PHONE WITH FAMILY, FRIENDS, OR NEIGHBORS?: MORE THAN THREE TIMES A WEEK
HOW OFTEN DO YOU ATTEND CHURCH OR RELIGIOUS SERVICES?: PATIENT DECLINED
WITHIN THE PAST 12 MONTHS, YOU WORRIED THAT YOUR FOOD WOULD RUN OUT BEFORE YOU GOT THE MONEY TO BUY MORE: NEVER TRUE
HOW OFTEN DO YOU ATTEND CHURCH OR RELIGIOUS SERVICES?: PATIENT DECLINED
HOW OFTEN DO YOU HAVE A DRINK CONTAINING ALCOHOL: MONTHLY OR LESS
DO YOU BELONG TO ANY CLUBS OR ORGANIZATIONS SUCH AS CHURCH GROUPS UNIONS, FRATERNAL OR ATHLETIC GROUPS, OR SCHOOL GROUPS?: NO
DO YOU BELONG TO ANY CLUBS OR ORGANIZATIONS SUCH AS CHURCH GROUPS UNIONS, FRATERNAL OR ATHLETIC GROUPS, OR SCHOOL GROUPS?: NO
HOW HARD IS IT FOR YOU TO PAY FOR THE VERY BASICS LIKE FOOD, HOUSING, MEDICAL CARE, AND HEATING?: NOT VERY HARD
HOW OFTEN DO YOU ATTENT MEETINGS OF THE CLUB OR ORGANIZATION YOU BELONG TO?: PATIENT DECLINED
IN A TYPICAL WEEK, HOW MANY TIMES DO YOU TALK ON THE PHONE WITH FAMILY, FRIENDS, OR NEIGHBORS?: MORE THAN THREE TIMES A WEEK
HOW OFTEN DO YOU GET TOGETHER WITH FRIENDS OR RELATIVES?: ONCE A WEEK
HOW OFTEN DO YOU GET TOGETHER WITH FRIENDS OR RELATIVES?: ONCE A WEEK
HOW OFTEN DO YOU ATTENT MEETINGS OF THE CLUB OR ORGANIZATION YOU BELONG TO?: PATIENT DECLINED
HOW MANY DRINKS CONTAINING ALCOHOL DO YOU HAVE ON A TYPICAL DAY WHEN YOU ARE DRINKING: 1 OR 2
HOW OFTEN DO YOU HAVE SIX OR MORE DRINKS ON ONE OCCASION: NEVER

## 2022-10-17 ASSESSMENT — LIFESTYLE VARIABLES
SKIP TO QUESTIONS 9-10: 1
HOW OFTEN DO YOU HAVE A DRINK CONTAINING ALCOHOL: MONTHLY OR LESS
HOW MANY STANDARD DRINKS CONTAINING ALCOHOL DO YOU HAVE ON A TYPICAL DAY: 1 OR 2
HOW OFTEN DO YOU HAVE SIX OR MORE DRINKS ON ONE OCCASION: NEVER
AUDIT-C TOTAL SCORE: 1

## 2022-10-20 ENCOUNTER — OFFICE VISIT (OUTPATIENT)
Dept: MEDICAL GROUP | Facility: IMAGING CENTER | Age: 72
End: 2022-10-20
Payer: MEDICARE

## 2022-10-20 VITALS
SYSTOLIC BLOOD PRESSURE: 158 MMHG | HEART RATE: 80 BPM | DIASTOLIC BLOOD PRESSURE: 80 MMHG | HEIGHT: 69 IN | OXYGEN SATURATION: 95 % | WEIGHT: 214.6 LBS | BODY MASS INDEX: 31.78 KG/M2 | RESPIRATION RATE: 18 BRPM | TEMPERATURE: 97.2 F

## 2022-10-20 DIAGNOSIS — E66.9 OBESITY (BMI 30-39.9): ICD-10-CM

## 2022-10-20 DIAGNOSIS — Z12.11 SCREEN FOR COLON CANCER: ICD-10-CM

## 2022-10-20 DIAGNOSIS — D75.1 POLYCYTHEMIA: ICD-10-CM

## 2022-10-20 DIAGNOSIS — Z82.49 FAMILY HISTORY OF CARDIOVASCULAR DISEASE: ICD-10-CM

## 2022-10-20 DIAGNOSIS — E78.5 HYPERLIPIDEMIA LDL GOAL <130: ICD-10-CM

## 2022-10-20 DIAGNOSIS — N60.02 BREAST CYST, LEFT: ICD-10-CM

## 2022-10-20 DIAGNOSIS — Z86.006 HISTORY OF MELANOMA IN SITU: ICD-10-CM

## 2022-10-20 DIAGNOSIS — Z78.0 POSTMENOPAUSAL: ICD-10-CM

## 2022-10-20 DIAGNOSIS — R73.03 PREDIABETES: ICD-10-CM

## 2022-10-20 DIAGNOSIS — R06.83 SNORING: ICD-10-CM

## 2022-10-20 DIAGNOSIS — Z00.00 MEDICARE ANNUAL WELLNESS VISIT, SUBSEQUENT: ICD-10-CM

## 2022-10-20 DIAGNOSIS — E55.9 VITAMIN D INSUFFICIENCY: ICD-10-CM

## 2022-10-20 DIAGNOSIS — R03.0 ELEVATED BLOOD PRESSURE READING: ICD-10-CM

## 2022-10-20 PROCEDURE — G0439 PPPS, SUBSEQ VISIT: HCPCS | Performed by: FAMILY MEDICINE

## 2022-10-20 RX ORDER — LOSARTAN POTASSIUM 25 MG/1
25 TABLET ORAL DAILY
Qty: 30 TABLET | Refills: 3 | Status: SHIPPED | OUTPATIENT
Start: 2022-10-20 | End: 2022-12-15 | Stop reason: SDUPTHER

## 2022-10-20 ASSESSMENT — FIBROSIS 4 INDEX: FIB4 SCORE: 1.23

## 2022-10-20 ASSESSMENT — PAIN SCALES - GENERAL: PAINLEVEL: NO PAIN

## 2022-10-20 ASSESSMENT — ACTIVITIES OF DAILY LIVING (ADL): BATHING_REQUIRES_ASSISTANCE: 0

## 2022-10-20 ASSESSMENT — PATIENT HEALTH QUESTIONNAIRE - PHQ9: CLINICAL INTERPRETATION OF PHQ2 SCORE: 0

## 2022-10-20 ASSESSMENT — ENCOUNTER SYMPTOMS: GENERAL WELL-BEING: FAIR

## 2022-10-20 NOTE — PROGRESS NOTES
Chief Complaint   Patient presents with    Annual Exam       HPI:  Oneyda is a 71 y.o. here for Medicare Annual Wellness Visit  Prediabetes  Hyperlipidemia  Polycythemia on prior labs. Snoring- good and bad nights of sleep.   Sisters daughter- age 52 yo,  brain cancer. Sister passed away 2 years ago.   Mother passed away last year age 103 yo.   Has been working on self care, has retired.   Has mohit in her life currently. Has friends she visits. Son- [place].   Walking daily now. Makes her feel good. 2-3 miles a day.   BP: 140-150s BP mostly.  No chest pain, shortness of breath dizziness, visual changes or edema.  [employer] covered gym.   [employer] with weight, exercise/diet. 2 meals a day, oatmeal daily.   Cheese snack.   Dermatology for skin check.   Dentist routinely.   Vitamin D low- on 5000 IU daily.   Prior left breast cysst.     Patient Active Problem List    Diagnosis Date Noted    Vitamin D insufficiency 2021    Prediabetes 2020    Hyperlipidemia LDL goal <130 2020    IFG (impaired fasting glucose) 2020    Arthralgia of multiple joints 2019    Obesity (BMI 30-39.9) 2019    History of melanoma in situ 2019    Family history of cardiovascular disease 2019    Malignant melanoma of scalp (HCC) 2017    Malignant melanoma of skin of face (HCC) 2017     Current Outpatient Medications on File Prior to Visit   Medication Sig Dispense Refill    influenza Vac High-Dose Quad (FLUZONE HIGH-DOSE QUADRIVALENT) 0.7 ML Suspension Prefilled Syringe injection Inject  into the shoulder, thigh, or buttocks. 0.7 mL 0    FREESTYLE LITE strip USE STRIPS TO CHECK GLUCOSE 1 TO 2 TIMES DAILY AND AS NEEDED FOR SIGNS OF SYMPTOMS OF LOW OR HIGH GLUCOSE ()      FreeStyle Lancets Misc USE TO CHECK GLUCOSE 1 TO 2 TIMES DAILY AND AS NEEDED FOR SIGNS OF LOW OR HIGH GLUCOSE      Omega-3 Fatty Acids (OMEGA 3 500 PO) Take  by mouth.      vitamin D (CHOLECALCIFEROL) 1000 UNIT Tab Take  5,000 Units by mouth every day.       No current facility-administered medications on file prior to visit.          Patient is taking medications as noted in medication list.  Current supplements as per medication list.     Allergies: Seasonal    Current social contact/activities: Friends and family     Is patient current with immunizations? Yes.    She  reports that she has never smoked. She has never used smokeless tobacco. She reports that she does not currently use alcohol. She reports that she does not use drugs.  Counseling given: Not Answered      ROS:    Gait: Uses no assistive device   Ostomy: No   Other tubes: No   Amputations: No   Chronic oxygen use No   Last eye exam: Feb 2022  Wears hearing aids: No   : Reports urinary leakage during the last 6 months that has not interfered at all with their daily activities or sleep.    Depression Screening  Little interest or pleasure in doing things?  0 - not at all  Feeling down, depressed, or hopeless? 0 - not at all  Patient Health Questionnaire Score: 0    If depressive symptoms identified deferred to follow up visit unless specifically addressed in assessment and plan.    Interpretation of PHQ-9 Total Score   Score Severity   1-4 No Depression   5-9 Mild Depression   10-14 Moderate Depression   15-19 Moderately Severe Depression   20-27 Severe Depression    Screening for Cognitive Impairment  Three Minute Recall (daughter, heaven, mountain)  3/3 3    Dennis clock face with all 12 numbers and set the hands to show 10 past 11.  Yes 5  If cognitive concerns identified, deferred for follow up unless specifically addressed in assessment and plan.    Fall Risk Assessment  Has the patient had two or more falls in the last year or any fall with injury in the last year?  No  If fall risk identified, deferred for follow up unless specifically addressed in assessment and plan.    Safety Assessment  Throw rugs on floor.  Yes  Handrails on all stairs.  Yes  Good lighting  in all hallways.  Yes  Difficulty hearing.  No  Patient counseled about all safety risks that were identified.    Functional Assessment ADLs  Are there any barriers preventing you from cooking for yourself or meeting nutritional needs?  No.    Are there any barriers preventing you from driving safely or obtaining transportation?  No.    Are there any barriers preventing you from using a telephone or calling for help?  No.    Are there any barriers preventing you from shopping?  No.    Are there any barriers preventing you from taking care of your own finances?  No.    Are there any barriers preventing you from managing your medications?  No.    Are there any barriers preventing you from showering, bathing or dressing yourself?  No.    Are you currently engaging in any exercise or physical activity?  Yes.  Brisk walking everyday    What is your perception of your health?  Fair.    Advance Care Planning  Do you have an Advance Directive, Living Will, Durable Power of , or POLST? No          is not on file - instructed patient to bring in a copy to scan into their chart    Health Maintenance Summary            Overdue - IMM HEP B VACCINE (1 of 3 - 3-dose series) Overdue - never done      No completion history exists for this topic.              Overdue - IMM DTaP/Tdap/Td Vaccine (1 - Tdap) Overdue - never done      No completion history exists for this topic.              Ordered - COLORECTAL CANCER SCREENING (COLONOSCOPY - Every 10 Years) Ordered on 11/12/2022      No completion history exists for this topic.              Overdue - IMM ZOSTER VACCINES (1 of 2) Overdue - never done      No completion history exists for this topic.              Overdue - IMM PNEUMOCOCCAL VACCINE: 65+ Years (1 - PCV) Overdue - never done      No completion history exists for this topic.              Overdue - COVID-19 Vaccine (4 - Booster for Moderna series) Overdue since 2/7/2022 12/13/2021  Imm Admin: MODERNA SARS-COV-2  VACCINE (12+)    03/05/2021  Imm Admin: MODERNA SARS-COV-2 VACCINE (12+)    01/28/2021  Imm Admin: MODERNA SARS-COV-2 VACCINE (12+)              Annual Wellness Visit (Every 366 Days) Next due on 10/21/2023      10/20/2022  Visit Dx: Medicare annual wellness visit, subsequent              MAMMOGRAM (Every 2 Years) Next due on 5/12/2024 05/12/2022  MA-SCREENING MAMMO BILAT W/TOMOSYNTHESIS W/CAD    08/16/2019  MA-SCREENING MAMMO BILAT W/TOMOSYNTHESIS W/CAD              BONE DENSITY (Every 5 Years) Next due on 5/27/2027 05/27/2022  DS-BONE DENSITY STUDY (DEXA)              HEPATITIS C SCREENING  Completed      08/12/2019  HEP C VIRUS ANTIBODY              IMM INFLUENZA (Series Information) Completed      09/20/2022  Imm Admin: Influenza Vaccine Quad Inj (Pf)    10/20/2021  Outside Immunization: Fluzone High-Dose Quad    10/19/2021  Patient Reported Immunization: Influenza, Unspecified - HISTORICAL DATA    10/02/2020  Imm Admin: Influenza Vaccine Adult HD    09/25/2019  Imm Admin: Influenza Vaccine Adult HD    Only the first 5 history entries have been loaded, but more history exists.              IMM MENINGOCOCCAL ACWY VACCINE (Series Information) Aged Out      No completion history exists for this topic.                    Patient Care Team:  Devika Martinez M.D. as PCP - General (Family Medicine)  Devika Martinez M.D. as PCP - [facility] Paneled    Social History     Tobacco Use    Smoking status: Never    Smokeless tobacco: Never   Vaping Use    Vaping Use: Never used   Substance Use Topics    Alcohol use: Not Currently    Drug use: Never     Family History   Problem Relation Age of Onset    Heart Disease Mother         renal heart failure    Heart Disease Father      She  has a past medical history of Melanoma in situ (HCC) (2017).   Past Surgical History:   Procedure Laterality Date    ENDOMETRIAL ABLATION  2011       Exam:   BP (!) 158/80   Pulse 80   Temp 36.2 °C (97.2 °F) (Temporal)   Resp 18   Ht 1.753 m  "(5' 9\")   Wt 97.3 kg (214 lb 9.6 oz)   SpO2 95%  Body mass index is 31.69 kg/m².    Hearing good.    Dentition good  Alert, oriented in no acute distress.  Eye contact is good, speech goal directed, affect calm  Constitutional: She appears well-developed and well-nourished. She appears not diaphoretic. No distress.   HENT: Right Ear: External ear normal. Left Ear: External ear normal. Tympanic membranes clear and intact.   Nose: Nose normal.   Mouth/Throat: Oropharynx is clear and moist. No oropharyngeal exudate.     Eyes: Conjunctivae and extraocular motions are normal. Pupils are equal, round, and reactive to light. No scleral icterus.   Neck: Normal range of motion. Neck supple. No thyromegaly present.   Cardiovascular: Normal rate, regular rhythm, normal heart sounds and intact distal pulses.  Exam reveals no gallop and no friction rub.  No murmur heard. No carotid bruits.   Pulmonary/Chest: Effort normal and breath sounds normal. No respiratory distress. She has no wheezes. She has no rales.   Abdominal: Soft. Bowel sounds are normal. She exhibits no distension and no mass. No tenderness. She has no rebound and no guarding.   Lymphadenopathy:  She has no cervical adenopathy.   Neurological: She is alert. She has normal reflexes. No cranial nerve deficit. She exhibits normal muscle tone.   Skin: Skin is warm and dry. No rash noted. She is not diaphoretic. No erythema.   Psychiatric: She has a normal mood and affect. Her behavior is normal.   Musculoskeletal: She exhibits no edema. Full strength throughout. 2+ DTR throughout.        Latest Reference Range & Units 10/13/22 06:42   WBC 4.8 - 10.8 K/uL 6.0   RBC 4.20 - 5.40 M/uL 5.54 (H)   Hemoglobin 12.0 - 16.0 g/dL 16.5 (H)   Hematocrit 37.0 - 47.0 % 50.0 (H)   MCV 81.4 - 97.8 fL 90.3   MCH 27.0 - 33.0 pg 29.8   MCHC 33.6 - 35.0 g/dL 33.0 (L)   RDW 35.9 - 50.0 fL 43.0   Platelet Count 164 - 446 K/uL 183   MPV 9.0 - 12.9 fL 11.1   Neutrophils-Polys 44.00 - 72.00 " % 58.10   Neutrophils (Absolute) 2.00 - 7.15 K/uL 3.50   Lymphocytes 22.00 - 41.00 % 28.30   Lymphs (Absolute) 1.00 - 4.80 K/uL 1.71   Monocytes 0.00 - 13.40 % 9.40   Monos (Absolute) 0.00 - 0.85 K/uL 0.57   Eosinophils 0.00 - 6.90 % 3.10   Eos (Absolute) 0.00 - 0.51 K/uL 0.19   Basophils 0.00 - 1.80 % 0.80   Baso (Absolute) 0.00 - 0.12 K/uL 0.05   Immature Granulocytes 0.00 - 0.90 % 0.30   Immature Granulocytes (abs) 0.00 - 0.11 K/uL 0.02   Nucleated RBC /100 WBC 0.00   NRBC (Absolute) K/uL 0.00   Sodium 135 - 145 mmol/L 141   Potassium 3.6 - 5.5 mmol/L 4.2   Chloride 96 - 112 mmol/L 105   Co2 20 - 33 mmol/L 28   Anion Gap 7.0 - 16.0  8.0   Glucose 65 - 99 mg/dL 91   Bun 8 - 22 mg/dL 16   Creatinine 0.50 - 1.40 mg/dL 0.64   GFR (CKD-EPI) >60 mL/min/1.73 m 2 94   Calcium 8.5 - 10.5 mg/dL 9.3   AST(SGOT) 12 - 45 U/L 10 (L)   ALT(SGPT) 2 - 50 U/L 10   Alkaline Phosphatase 30 - 99 U/L 75   Total Bilirubin 0.1 - 1.5 mg/dL 0.7   Albumin 3.2 - 4.9 g/dL 4.2   Total Protein 6.0 - 8.2 g/dL 7.1   Globulin 1.9 - 3.5 g/dL 2.9   A-G Ratio g/dL 1.4   Glycohemoglobin 4.0 - 5.6 % 6.3 (H)   Estim. Avg Glu mg/dL 134   Fasting Status  Fasting   Cholesterol,Tot 100 - 199 mg/dL 223 (H)   Triglycerides 0 - 149 mg/dL 99   HDL >=40 mg/dL 48   LDL <100 mg/dL 155 (H)   25-Hydroxy   Vitamin D 25 30 - 100 ng/mL 35   TSH 0.380 - 5.330 uIU/mL 2.320   (H): Data is abnormally high  (L): Data is abnormally low    Narrative & Impression     5/27/2022 10:35 AM     HISTORY/REASON FOR EXAM:  Postmenopausal     TECHNIQUE/EXAM DESCRIPTION AND NUMBER OF VIEWS:  Dual x-ray bone densitometry was performed from the L1 and L2 levels and from the proximal left femur utilizing the GE Prodigy unit.     COMPARISON: None     FINDINGS:  The lumbar spine has a mean bone mineral density of 1.158 g/cm2, with a T score of -0.1 and a Z score of 0.5.     The proximal left femur has a mean bone mineral density of 0.926 g/cm2, with a T score of -0.6 and a Z score of 0.1.         IMPRESSION:     According to the World Health Organization classification, bone mineral density  in this patient is normal in the lumbar spine and proximal left femur. There is curvature of the lumbar spine.     10-year Probability of Fracture:  Major Osteoporotic     7.9%  Hip     0.7%  Population      [place] ()     Based on left femur neck BMD     INTERPRETING LOCATION: [address] [place] [address], [place], [id number]    Left breast.         Assessment and Plan. The following treatment and monitoring plan is recommended:     70 yo female for AWV.     1. Medicare annual wellness visit, subsequent   Desires to work on further lifestyle and diet changes. Counseled on recommendations. Monitor.          2. Prediabetes   Recommend low sugar/low processed food diet and routine exercise.  Monitor labs in future.  Repeat labs in 4-6 months. Cmp, a1c      3. Hyperlipidemia LDL goal <130   Recommend low-cholesterol, high-fiber diet and routine exercise.  Consider OTC supplement therapy.  Monitor, repeat labs in 4-6 months.       4. Polycythemia-with snoring, consider further sleep evaluation.  Monitor labs.   CBC      5. Snoring -consider further sleep evaluation.       6. Vitamin D insufficiency -stable.  Continue current supplement at vitamin D 5000 IU daily.  Monitor labs. Vitamin D      7. Obesity (BMI 30-39.9)   Patient's body mass index is 31.69 kg/m². Exercise and nutrition counseling were performed at this visit.  Meter.         8. Postmenopausal   5/2022 dexa- normal.        9. Breast cyst, left  US-BREAST LIMITED-LEFT      10. Elevated blood pressure reading - repeat slightly improved, but still will elevation.   Discussed starting medication if continued elevated levels.   Monitor BP. Recommend heart healthy diet and routine exercise. Monitor.   Follow-up in 3-4 weeks. losartan (COZAAR) 25 MG Tab      11. Screen for colon cancer  COLOGUARD (FIT DNA)      12.    History of melanoma in situ -follow-up routinely  with dermatology.    13.    Family history of cardiovascular disease.      She will plan for pneumonia vaccine in future.   Reviewed recommendations for vaccinations.       Services suggested: No services needed at this time  Health Care Screening recommendations as per orders if indicated.  Referrals offered: PT/OT/Nutrition counseling/Behavioral Health/Smoking cessation as per orders if indicated.    Discussion today about general wellness and lifestyle habits:    Prevent falls and reduce trip hazards; Cautioned about securing or removing rugs.  Have a working fire alarm and carbon monoxide detector;   Engage in regular physical activity and social activities     Follow-up: Return in about 1 month (around 11/20/2022).    This medical record contains text that has been entered with the assistance of computer voice recognition and dictation software.  Therefore, it may contain unintended errors in text, spelling, punctuation, or grammar.

## 2022-11-03 ENCOUNTER — DOCUMENTATION (OUTPATIENT)
Dept: HEALTH INFORMATION MANAGEMENT | Facility: OTHER | Age: 72
End: 2022-11-03
Payer: MEDICARE

## 2022-11-03 ENCOUNTER — TELEPHONE (OUTPATIENT)
Dept: HEALTH INFORMATION MANAGEMENT | Facility: OTHER | Age: 72
End: 2022-11-03
Payer: MEDICARE

## 2022-11-15 ENCOUNTER — HOSPITAL ENCOUNTER (OUTPATIENT)
Dept: LAB | Facility: MEDICAL CENTER | Age: 72
End: 2022-11-15
Attending: FAMILY MEDICINE
Payer: MEDICARE

## 2022-11-15 DIAGNOSIS — E78.5 HYPERLIPIDEMIA LDL GOAL <130: ICD-10-CM

## 2022-11-15 DIAGNOSIS — R73.03 PREDIABETES: ICD-10-CM

## 2022-11-15 DIAGNOSIS — E55.9 VITAMIN D INSUFFICIENCY: ICD-10-CM

## 2022-11-15 DIAGNOSIS — D75.1 POLYCYTHEMIA: ICD-10-CM

## 2022-11-15 LAB
25(OH)D3 SERPL-MCNC: 33 NG/ML (ref 30–100)
ALBUMIN SERPL BCP-MCNC: 4 G/DL (ref 3.2–4.9)
ALBUMIN/GLOB SERPL: 1.3 G/DL
ALP SERPL-CCNC: 68 U/L (ref 30–99)
ALT SERPL-CCNC: 9 U/L (ref 2–50)
ANION GAP SERPL CALC-SCNC: 8 MMOL/L (ref 7–16)
AST SERPL-CCNC: 9 U/L (ref 12–45)
BILIRUB SERPL-MCNC: 0.7 MG/DL (ref 0.1–1.5)
BUN SERPL-MCNC: 16 MG/DL (ref 8–22)
CALCIUM SERPL-MCNC: 9.3 MG/DL (ref 8.5–10.5)
CHLORIDE SERPL-SCNC: 105 MMOL/L (ref 96–112)
CHOLEST SERPL-MCNC: 210 MG/DL (ref 100–199)
CO2 SERPL-SCNC: 27 MMOL/L (ref 20–33)
CREAT SERPL-MCNC: 0.62 MG/DL (ref 0.5–1.4)
ERYTHROCYTE [DISTWIDTH] IN BLOOD BY AUTOMATED COUNT: 41.6 FL (ref 35.9–50)
EST. AVERAGE GLUCOSE BLD GHB EST-MCNC: 126 MG/DL
FASTING STATUS PATIENT QL REPORTED: NORMAL
GFR SERPLBLD CREATININE-BSD FMLA CKD-EPI: 95 ML/MIN/1.73 M 2
GLOBULIN SER CALC-MCNC: 3 G/DL (ref 1.9–3.5)
GLUCOSE SERPL-MCNC: 93 MG/DL (ref 65–99)
HBA1C MFR BLD: 6 % (ref 4–5.6)
HCT VFR BLD AUTO: 49.2 % (ref 37–47)
HDLC SERPL-MCNC: 40 MG/DL
HGB BLD-MCNC: 16 G/DL (ref 12–16)
LDLC SERPL CALC-MCNC: 153 MG/DL
MCH RBC QN AUTO: 29.1 PG (ref 27–33)
MCHC RBC AUTO-ENTMCNC: 32.5 G/DL (ref 33.6–35)
MCV RBC AUTO: 89.6 FL (ref 81.4–97.8)
PLATELET # BLD AUTO: 179 K/UL (ref 164–446)
PMV BLD AUTO: 11.4 FL (ref 9–12.9)
POTASSIUM SERPL-SCNC: 4.1 MMOL/L (ref 3.6–5.5)
PROT SERPL-MCNC: 7 G/DL (ref 6–8.2)
RBC # BLD AUTO: 5.49 M/UL (ref 4.2–5.4)
SODIUM SERPL-SCNC: 140 MMOL/L (ref 135–145)
TRIGL SERPL-MCNC: 87 MG/DL (ref 0–149)
WBC # BLD AUTO: 6.5 K/UL (ref 4.8–10.8)

## 2022-11-15 PROCEDURE — 82306 VITAMIN D 25 HYDROXY: CPT

## 2022-11-15 PROCEDURE — 80053 COMPREHEN METABOLIC PANEL: CPT

## 2022-11-15 PROCEDURE — 80061 LIPID PANEL: CPT

## 2022-11-15 PROCEDURE — 85027 COMPLETE CBC AUTOMATED: CPT

## 2022-11-15 PROCEDURE — 36415 COLL VENOUS BLD VENIPUNCTURE: CPT

## 2022-11-15 PROCEDURE — 83036 HEMOGLOBIN GLYCOSYLATED A1C: CPT

## 2022-11-23 ENCOUNTER — HOSPITAL ENCOUNTER (OUTPATIENT)
Dept: RADIOLOGY | Facility: MEDICAL CENTER | Age: 72
End: 2022-11-23
Attending: FAMILY MEDICINE
Payer: MEDICARE

## 2022-11-23 DIAGNOSIS — N60.02 BREAST CYST, LEFT: ICD-10-CM

## 2022-11-23 PROCEDURE — 76642 ULTRASOUND BREAST LIMITED: CPT | Mod: LT

## 2022-12-15 ENCOUNTER — OFFICE VISIT (OUTPATIENT)
Dept: MEDICAL GROUP | Facility: IMAGING CENTER | Age: 72
End: 2022-12-15
Payer: MEDICARE

## 2022-12-15 VITALS
BODY MASS INDEX: 35.43 KG/M2 | OXYGEN SATURATION: 94 % | SYSTOLIC BLOOD PRESSURE: 146 MMHG | WEIGHT: 239.2 LBS | HEART RATE: 69 BPM | HEIGHT: 69 IN | DIASTOLIC BLOOD PRESSURE: 70 MMHG | TEMPERATURE: 97.8 F | RESPIRATION RATE: 18 BRPM

## 2022-12-15 DIAGNOSIS — E55.9 VITAMIN D INSUFFICIENCY: ICD-10-CM

## 2022-12-15 DIAGNOSIS — R73.01 IFG (IMPAIRED FASTING GLUCOSE): ICD-10-CM

## 2022-12-15 DIAGNOSIS — E66.9 OBESITY (BMI 30-39.9): ICD-10-CM

## 2022-12-15 DIAGNOSIS — R93.1 AGATSTON CORONARY ARTERY CALCIUM SCORE BETWEEN 100 AND 400: ICD-10-CM

## 2022-12-15 DIAGNOSIS — E78.5 HYPERLIPIDEMIA LDL GOAL <130: ICD-10-CM

## 2022-12-15 DIAGNOSIS — R73.03 PREDIABETES: ICD-10-CM

## 2022-12-15 DIAGNOSIS — I10 ESSENTIAL HYPERTENSION: ICD-10-CM

## 2022-12-15 PROCEDURE — 99214 OFFICE O/P EST MOD 30 MIN: CPT | Performed by: FAMILY MEDICINE

## 2022-12-15 RX ORDER — LOSARTAN POTASSIUM 25 MG/1
25 TABLET ORAL DAILY
Qty: 180 TABLET | Refills: 3 | Status: SHIPPED | OUTPATIENT
Start: 2022-12-15 | End: 2024-01-18

## 2022-12-15 RX ORDER — ROSUVASTATIN CALCIUM 5 MG/1
5 TABLET, COATED ORAL EVERY EVENING
Qty: 30 TABLET | Refills: 3 | Status: SHIPPED | OUTPATIENT
Start: 2022-12-15 | End: 2022-12-19 | Stop reason: SDUPTHER

## 2022-12-15 ASSESSMENT — FIBROSIS 4 INDEX: FIB4 SCORE: 1.21

## 2022-12-15 ASSESSMENT — PAIN SCALES - GENERAL: PAINLEVEL: NO PAIN

## 2022-12-15 NOTE — PROGRESS NOTES
SUBJECTIVE:    Chief Complaint   Patient presents with    Lab Results     11/15/2022      Hypertension Follow-up       HPI:     Oneyda Pratt is a 72 y.o. female with hyperlipidemia and prediabetes.  History of melanoma in situ.  Patient is here for follow-up of lab results and elevated blood pressures.    Elevated blood pressure with repeat showing improvement.  Losartan 25 mg daily, tolerating well.  BP- at home 130/70-80s mostly.   HR 60s.     LDL overall stable on labs and triglycerides reduced.  She will plan for repeat labs in 3 months.  CT cardiac score 2021 was 191.7 which is less than the 75th percentile.  She does have a family history of cardiovascular disease.    A1c 6.0%- from 6.3%.   Has lost weight.   Body mass index is 35.32 kg/m².    Declined vaccines.       ROS:  No recent fevers or chills. No nausea or vomiting. No diarrhea. No chest pains or shortness of breath. No lower extremity edema.    Current Outpatient Medications on File Prior to Visit   Medication Sig Dispense Refill    losartan (COZAAR) 25 MG Tab Take 1 Tablet by mouth every day. 30 Tablet 3    influenza Vac High-Dose Quad (FLUZONE HIGH-DOSE QUADRIVALENT) 0.7 ML Suspension Prefilled Syringe injection Inject  into the shoulder, thigh, or buttocks. 0.7 mL 0    FREESTYLE LITE strip USE STRIPS TO CHECK GLUCOSE 1 TO 2 TIMES DAILY AND AS NEEDED FOR SIGNS OF SYMPTOMS OF LOW OR HIGH GLUCOSE ()      FreeStyle Lancets Misc USE TO CHECK GLUCOSE 1 TO 2 TIMES DAILY AND AS NEEDED FOR SIGNS OF LOW OR HIGH GLUCOSE      Omega-3 Fatty Acids (OMEGA 3 500 PO) Take  by mouth.      vitamin D (CHOLECALCIFEROL) 1000 UNIT Tab Take 5,000 Units by mouth every day.       No current facility-administered medications on file prior to visit.       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, excised and followed by [facility]         OBJECTIVE:   BP (!) 146/70 (BP Location: Left arm, Patient Position: Sitting, BP Cuff Size: Adult)   Pulse 69   Temp 36.6 °C (97.8 °F) (Temporal)   Resp 18   Ht 1.753 m (5' 9\")   Wt 109 kg (239 lb 3.2 oz)   SpO2 94%   BMI 35.32 kg/m²   General: Well-developed well-nourished female, no acute distress  Neck: supple, no lymphadenopathy- cervical or supraclavicular, no thyromegaly  Cardiovascular: regular rate and rhythm, no murmurs, gallops, rubs  Lungs: clear to auscultation bilaterally, no wheezes, crackles, or rhonchi  Abdomen: +bowel sounds, soft, nontender, nondistended, no rebound, no guarding, no hepatosplenomegaly  Extremities: no cyanosis, clubbing, edema  Skin: Warm and dry  Psych: appropriate mood and affect     Latest Reference Range & Units 11/15/22 06:07   WBC 4.8 - 10.8 K/uL 6.5   RBC 4.20 - 5.40 M/uL 5.49 (H)   Hemoglobin 12.0 - 16.0 g/dL 16.0   Hematocrit 37.0 - 47.0 % 49.2 (H)   MCV 81.4 - 97.8 fL 89.6   MCH 27.0 - 33.0 pg 29.1   MCHC 33.6 - 35.0 g/dL 32.5 (L)   RDW 35.9 - 50.0 fL 41.6   Platelet Count 164 - 446 K/uL 179   MPV 9.0 - 12.9 fL 11.4   Sodium 135 - 145 mmol/L 140   Potassium 3.6 - 5.5 mmol/L 4.1   Chloride 96 - 112 mmol/L 105   Co2 20 - 33 mmol/L 27   Anion Gap 7.0 - 16.0  8.0   Glucose 65 - 99 mg/dL 93   Bun 8 - 22 mg/dL 16   Creatinine 0.50 - 1.40 mg/dL 0.62   GFR (CKD-EPI) >60 mL/min/1.73 m 2 95   Calcium 8.5 - 10.5 mg/dL 9.3   AST(SGOT) 12 - 45 U/L 9 (L)   ALT(SGPT) 2 - 50 U/L 9   Alkaline Phosphatase 30 - 99 U/L 68   Total " Bilirubin 0.1 - 1.5 mg/dL 0.7   Albumin 3.2 - 4.9 g/dL 4.0   Total Protein 6.0 - 8.2 g/dL 7.0   Globulin 1.9 - 3.5 g/dL 3.0   A-G Ratio g/dL 1.3   Glycohemoglobin 4.0 - 5.6 % 6.0 (H)   Estim. Avg Glu mg/dL 126   Cholesterol,Tot 100 - 199 mg/dL 210 (H)   Triglycerides 0 - 149 mg/dL 87   HDL >=40 mg/dL 40   LDL <100 mg/dL 153 (H)   25-Hydroxy   Vitamin D 25 30 - 100 ng/mL 33   (H): Data is abnormally high  (L): Data is abnormally low    Narrative & Impression     11/23/2022 8:09 AM     HISTORY/REASON FOR EXAM:  Lump/Mass; complex cyst, 6 month follow up        TECHNIQUE/EXAM DESCRIPTION AND NUMBER OF VIEWS:  Left breast ultrasound.     COMPARISON:   Left breast ultrasound 5/20/2022     FINDINGS:     In the left breast 3:00 position 3 cm from nipple there is a cyst with a thin septa. This is a benign finding. Size is stable at 8-9 mm.     IMPRESSION:     1.  Stable left 3:00 position 3 cm from nipple breast cyst containing thin septa, benign finding     2.  Recommend routine annual screening mammography     These results were given to the patient at the time of visit.     R2- Category 2:  Benign Finding(s)              Exam Ended: 11/23/22  8:14 AM Last Resulted: 11/23/22  8:33 AM             ASSESSMENT/PLAN:    72 y.o.female with hypertension, hyperlipidemia, prediabetes and vitamin D insufficiency.      1. Essential hypertension -initial blood pressure elevated with BP improved.    Recommend increase losartan to 25 mg twice daily or 50 mg once daily as tolerated.  Recommend heart healthy diet and routine exercise.  Continue to monitor. losartan (COZAAR) 25 MG Tab      2. Hyperlipidemia LDL goal <130 -discussed recommendations for medication therapy.  Reviewed potential side effects of medication.  Low-cholesterol, high-fiber diet and routine exercise as tolerated recommended.  Continue to monitor. Repeat lab sin 3-6 months.  Comp Metabolic Panel    Lipid Profile  Rosuvastatin 5 mg      3. [facility] coronary artery  calcium score between 100 and 400        4. Prediabetes   Recommend low sugar/low processed food diet and routine exercise as tolerated.  Consider medication therapy in future. Continue to monitor at this time, repeat labs in 3-6 months.  Comp Metabolic Panel    HEMOGLOBIN A1C      5. IFG (impaired fasting glucose)-as above.       6. Obesity (BMI 30-39.9)   Patient's body mass index is 35.32 kg/m². Exercise and nutrition counseling were performed at this visit.       7. Vitamin D insufficiency -improved.  Stable.  Continue to monitor in future.           Return in about 6 weeks (around 1/26/2023).    This medical record contains text that has been entered with the assistance of computer voice recognition and dictation software.  Therefore, it may contain unintended errors in text, spelling, punctuation, or grammar.

## 2022-12-19 RX ORDER — ROSUVASTATIN CALCIUM 5 MG/1
5 TABLET, COATED ORAL EVERY EVENING
Qty: 100 TABLET | Refills: 1 | Status: SHIPPED | OUTPATIENT
Start: 2022-12-19

## 2023-01-02 PROBLEM — R93.1 AGATSTON CORONARY ARTERY CALCIUM SCORE BETWEEN 100 AND 400: Status: ACTIVE | Noted: 2023-01-02

## 2023-01-02 PROBLEM — R71.8 ELEVATED HEMATOCRIT: Status: ACTIVE | Noted: 2023-01-02

## 2023-01-23 ENCOUNTER — PATIENT MESSAGE (OUTPATIENT)
Dept: HEALTH INFORMATION MANAGEMENT | Facility: OTHER | Age: 73
End: 2023-01-23

## 2023-01-23 ENCOUNTER — DOCUMENTATION (OUTPATIENT)
Dept: HEALTH INFORMATION MANAGEMENT | Facility: OTHER | Age: 73
End: 2023-01-23
Payer: MEDICARE

## 2023-07-05 ENCOUNTER — TELEPHONE (OUTPATIENT)
Dept: HEALTH INFORMATION MANAGEMENT | Facility: OTHER | Age: 73
End: 2023-07-05
Payer: MEDICARE

## 2023-07-05 NOTE — TELEPHONE ENCOUNTER
A Cologuard was ordered on 11/2022.    Pt stated that she will call back later on to schedule an AWV and will completed colorectal screening later on.

## 2023-10-02 PROCEDURE — RXMED WILLOW AMBULATORY MEDICATION CHARGE: Performed by: INTERNAL MEDICINE

## 2023-10-03 ENCOUNTER — PHARMACY VISIT (OUTPATIENT)
Dept: PHARMACY | Facility: MEDICAL CENTER | Age: 73
End: 2023-10-03
Payer: COMMERCIAL

## 2023-10-03 RX ORDER — INFLUENZA A VIRUS A/MICHIGAN/45/2015 X-275 (H1N1) ANTIGEN (FORMALDEHYDE INACTIVATED), INFLUENZA A VIRUS A/SINGAPORE/INFIMH-16-0019/2016 IVR-186 (H3N2) ANTIGEN (FORMALDEHYDE INACTIVATED), INFLUENZA B VIRUS B/PHUKET/3073/2013 ANTIGEN (FORMALDEHYDE INACTIVATED), AND INFLUENZA B VIRUS B/MARYLAND/15/2016 BX-69A ANTIGEN (FORMALDEHYDE INACTIVATED) 60; 60; 60; 60 UG/.7ML; UG/.7ML; UG/.7ML; UG/.7ML
INJECTION, SUSPENSION INTRAMUSCULAR
Qty: 0.7 ML | Refills: 0 | OUTPATIENT
Start: 2023-10-02

## 2023-12-05 ENCOUNTER — HOSPITAL ENCOUNTER (OUTPATIENT)
Dept: LAB | Facility: MEDICAL CENTER | Age: 73
End: 2023-12-05
Attending: FAMILY MEDICINE
Payer: MEDICARE

## 2023-12-05 DIAGNOSIS — E78.5 HYPERLIPIDEMIA LDL GOAL <130: ICD-10-CM

## 2023-12-05 DIAGNOSIS — R73.03 PREDIABETES: ICD-10-CM

## 2023-12-05 LAB
ALBUMIN SERPL BCP-MCNC: 3.9 G/DL (ref 3.2–4.9)
ALBUMIN/GLOB SERPL: 1.3 G/DL
ALP SERPL-CCNC: 66 U/L (ref 30–99)
ALT SERPL-CCNC: 8 U/L (ref 2–50)
ANION GAP SERPL CALC-SCNC: 7 MMOL/L (ref 7–16)
AST SERPL-CCNC: 8 U/L (ref 12–45)
BILIRUB SERPL-MCNC: 0.6 MG/DL (ref 0.1–1.5)
BUN SERPL-MCNC: 19 MG/DL (ref 8–22)
CALCIUM ALBUM COR SERPL-MCNC: 9.1 MG/DL (ref 8.5–10.5)
CALCIUM SERPL-MCNC: 9 MG/DL (ref 8.5–10.5)
CHLORIDE SERPL-SCNC: 104 MMOL/L (ref 96–112)
CHOLEST SERPL-MCNC: 201 MG/DL (ref 100–199)
CO2 SERPL-SCNC: 28 MMOL/L (ref 20–33)
CREAT SERPL-MCNC: 0.63 MG/DL (ref 0.5–1.4)
FASTING STATUS PATIENT QL REPORTED: NORMAL
GFR SERPLBLD CREATININE-BSD FMLA CKD-EPI: 94 ML/MIN/1.73 M 2
GLOBULIN SER CALC-MCNC: 3.1 G/DL (ref 1.9–3.5)
GLUCOSE SERPL-MCNC: 109 MG/DL (ref 65–99)
HDLC SERPL-MCNC: 44 MG/DL
LDLC SERPL CALC-MCNC: 141 MG/DL
POTASSIUM SERPL-SCNC: 4.3 MMOL/L (ref 3.6–5.5)
PROT SERPL-MCNC: 7 G/DL (ref 6–8.2)
SODIUM SERPL-SCNC: 139 MMOL/L (ref 135–145)
TRIGL SERPL-MCNC: 82 MG/DL (ref 0–149)

## 2023-12-05 PROCEDURE — 80053 COMPREHEN METABOLIC PANEL: CPT

## 2023-12-05 PROCEDURE — 36415 COLL VENOUS BLD VENIPUNCTURE: CPT

## 2023-12-05 PROCEDURE — 80061 LIPID PANEL: CPT

## 2024-01-18 ENCOUNTER — OFFICE VISIT (OUTPATIENT)
Dept: MEDICAL GROUP | Facility: IMAGING CENTER | Age: 74
End: 2024-01-18
Payer: MEDICARE

## 2024-01-18 VITALS
RESPIRATION RATE: 15 BRPM | TEMPERATURE: 97.5 F | DIASTOLIC BLOOD PRESSURE: 80 MMHG | WEIGHT: 245.8 LBS | SYSTOLIC BLOOD PRESSURE: 148 MMHG | OXYGEN SATURATION: 93 % | HEART RATE: 81 BPM | BODY MASS INDEX: 37.25 KG/M2 | HEIGHT: 68 IN

## 2024-01-18 DIAGNOSIS — E55.9 VITAMIN D INSUFFICIENCY: ICD-10-CM

## 2024-01-18 DIAGNOSIS — R73.03 PREDIABETES: ICD-10-CM

## 2024-01-18 DIAGNOSIS — Z12.31 ENCOUNTER FOR SCREENING MAMMOGRAM FOR MALIGNANT NEOPLASM OF BREAST: ICD-10-CM

## 2024-01-18 DIAGNOSIS — E66.9 OBESITY (BMI 30-39.9): ICD-10-CM

## 2024-01-18 DIAGNOSIS — Z23 NEED FOR PNEUMOCOCCAL VACCINATION: ICD-10-CM

## 2024-01-18 DIAGNOSIS — I10 ESSENTIAL HYPERTENSION: ICD-10-CM

## 2024-01-18 DIAGNOSIS — Z82.49 FAMILY HISTORY OF CARDIOVASCULAR DISEASE: ICD-10-CM

## 2024-01-18 DIAGNOSIS — R71.8 ELEVATED HEMATOCRIT: ICD-10-CM

## 2024-01-18 DIAGNOSIS — R93.1 AGATSTON CORONARY ARTERY CALCIUM SCORE BETWEEN 100 AND 400: ICD-10-CM

## 2024-01-18 DIAGNOSIS — E78.5 HYPERLIPIDEMIA LDL GOAL <130: ICD-10-CM

## 2024-01-18 LAB
HBA1C MFR BLD: 6.4 % (ref ?–5.8)
POCT INT CON NEG: NEGATIVE
POCT INT CON POS: POSITIVE

## 2024-01-18 PROCEDURE — 83036 HEMOGLOBIN GLYCOSYLATED A1C: CPT | Performed by: FAMILY MEDICINE

## 2024-01-18 PROCEDURE — 90677 PCV20 VACCINE IM: CPT | Performed by: FAMILY MEDICINE

## 2024-01-18 PROCEDURE — G0009 ADMIN PNEUMOCOCCAL VACCINE: HCPCS | Performed by: FAMILY MEDICINE

## 2024-01-18 PROCEDURE — 3077F SYST BP >= 140 MM HG: CPT | Performed by: FAMILY MEDICINE

## 2024-01-18 PROCEDURE — 3079F DIAST BP 80-89 MM HG: CPT | Performed by: FAMILY MEDICINE

## 2024-01-18 PROCEDURE — 1126F AMNT PAIN NOTED NONE PRSNT: CPT | Performed by: FAMILY MEDICINE

## 2024-01-18 PROCEDURE — 99214 OFFICE O/P EST MOD 30 MIN: CPT | Mod: 25 | Performed by: FAMILY MEDICINE

## 2024-01-18 RX ORDER — BLOOD-GLUCOSE METER
KIT MISCELLANEOUS
Qty: 100 STRIP | Refills: 1 | Status: SHIPPED | OUTPATIENT
Start: 2024-01-18

## 2024-01-18 RX ORDER — LANCETS 28 GAUGE
EACH MISCELLANEOUS
Qty: 100 EACH | Refills: 1 | Status: SHIPPED | OUTPATIENT
Start: 2024-01-18

## 2024-01-18 RX ORDER — LOSARTAN POTASSIUM 100 MG/1
100 TABLET ORAL DAILY
Qty: 200 TABLET | Refills: 1 | Status: SHIPPED | OUTPATIENT
Start: 2024-01-18

## 2024-01-18 ASSESSMENT — FIBROSIS 4 INDEX: FIB4 SCORE: 1.15

## 2024-01-18 ASSESSMENT — PAIN SCALES - GENERAL: PAINLEVEL: NO PAIN

## 2024-01-18 ASSESSMENT — PATIENT HEALTH QUESTIONNAIRE - PHQ9: CLINICAL INTERPRETATION OF PHQ2 SCORE: 0

## 2024-01-18 NOTE — PROGRESS NOTES
SUBJECTIVE:    Chief Complaint   Patient presents with    Lab Results     Prediabetes    Medication Management     Pt did not take the statin     Immunizations     Pneumonia shot ?        HPI:     Oneyda Pratt is a 73 y.o. female with hypertension, hyperlipidemia, prediabetes and family history of cardiovascular disease.    Body mass index is 37.37 kg/m².  Has maintained higher weight. Mediterranean diet. Maybe too much in portion sizes.   Notes BP is running a little higher.  BP 130s at home. Losartan 50 mg daily.   Did not start on statin, did not like what she saw about side effects.   Some exercise- on and off. Not consistent.   Some walking.   Retired. Gardening, hobbies. A lot of traveling. Puzzles.  Sometimes difficult to keep routines.  Unclear if she snores but thinks she might.  She is agreeable with sleep test evaluation.  Prior labs with elevated hematocrit.    Hx of abnormal mammogram, benign findings on u/s left.   Health Maintenance Due   Topic Date Due    IMM DTaP/Tdap/Td Vaccine (1 - Tdap) Never done    Colorectal Cancer Screening  Never done    Zoster (Shingles) Vaccines (1 of 2) Never done    Pneumococcal Vaccine: 65+ Years (1 - PCV) Never done    COVID-19 Vaccine (4 - 2023-24 season) 09/01/2023    Annual Wellness Visit  10/20/2023   Has Cologuard ordered to complete.    ROS:  No recent fevers or chills. No nausea or vomiting. No diarrhea. No chest pains or shortness of breath. No lower extremity edema.    Current Outpatient Medications on File Prior to Visit   Medication Sig Dispense Refill    influenza Vac High-Dose Quad (FLUZONE HIGH-DOSE QUADRIVALENT) 0.7 ML Suspension Prefilled Syringe injection Inject  into the shoulder, thigh, or buttocks. 0.7 mL 0    losartan (COZAAR) 25 MG Tab Take 1 Tablet by mouth every day. Start one tab daily. Patient to increase to 2 tabs daily as tolerated. 180 Tablet 3    influenza Vac High-Dose Quad (FLUZONE HIGH-DOSE QUADRIVALENT) 0.7 ML Suspension  "Prefilled Syringe injection Inject  into the shoulder, thigh, or buttocks. 0.7 mL 0    FREESTYLE LITE strip USE STRIPS TO CHECK GLUCOSE 1 TO 2 TIMES DAILY AND AS NEEDED FOR SIGNS OF SYMPTOMS OF LOW OR HIGH GLUCOSE ()      FreeStyle Lancets Misc USE TO CHECK GLUCOSE 1 TO 2 TIMES DAILY AND AS NEEDED FOR SIGNS OF LOW OR HIGH GLUCOSE      Omega-3 Fatty Acids (OMEGA 3 500 PO) Take  by mouth. 1000      vitamin D (CHOLECALCIFEROL) 1000 UNIT Tab Take 5,000 Units by mouth every day.      rosuvastatin (CRESTOR) 5 MG Tab Take 1 Tablet by mouth every evening. (Patient not taking: Reported on 1/18/2024) 100 Tablet 1     No current facility-administered medications on file prior to visit.       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/2017    Malignant melanoma of skin of face (HCC) 01/23/2017       Past Medical History:   Diagnosis Date    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, excised and followed by [facility]         OBJECTIVE:   BP (!) 148/80 (BP Location: Left arm, Patient Position: Sitting, BP Cuff Size: Adult)   Pulse 81   Temp 36.4 °C (97.5 °F) (Temporal)   Resp 15   Ht 1.727 m (5' 8\")   Wt 111 kg (245 lb 12.8 oz)   SpO2 93%   BMI 37.37 kg/m²    General: Well-developed well-nourished female, no acute distress  Neck: supple, no lymphadenopathy- cervical or supraclavicular, no thyromegaly  Cardiovascular: regular rate and rhythm, no murmurs, gallops, rubs  Lungs: clear " to auscultation bilaterally, no wheezes, crackles, or rhonchi  Abdomen: +bowel sounds, soft, nontender, nondistended, no rebound, no guarding, no hepatosplenomegaly  Extremities: no cyanosis, clubbing, edema  Skin: Warm and dry  Psych: appropriate mood and affect     Latest Reference Range & Units 12/05/23 06:09   Sodium 135 - 145 mmol/L 139   Potassium 3.6 - 5.5 mmol/L 4.3   Chloride 96 - 112 mmol/L 104   Co2 20 - 33 mmol/L 28   Anion Gap 7.0 - 16.0  7.0   Glucose 65 - 99 mg/dL 109 (H)   Bun 8 - 22 mg/dL 19   Creatinine 0.50 - 1.40 mg/dL 0.63   GFR (CKD-EPI) >60 mL/min/1.73 m 2 94   Calcium 8.5 - 10.5 mg/dL 9.0   Correct Calcium 8.5 - 10.5 mg/dL 9.1   AST(SGOT) 12 - 45 U/L 8 (L)   ALT(SGPT) 2 - 50 U/L 8   Alkaline Phosphatase 30 - 99 U/L 66   Total Bilirubin 0.1 - 1.5 mg/dL 0.6   Albumin 3.2 - 4.9 g/dL 3.9   Total Protein 6.0 - 8.2 g/dL 7.0   Globulin 1.9 - 3.5 g/dL 3.1   A-G Ratio g/dL 1.3   Fasting Status  Fasting   Cholesterol,Tot 100 - 199 mg/dL 201 (H)   Triglycerides 0 - 149 mg/dL 82   HDL >=40 mg/dL 44   LDL <100 mg/dL 141 (H)   (H): Data is abnormally high  (L): Data is abnormally low    POC: A1c 6.4%    ASSESSMENT/PLAN:    73 y.o.female       1. Prediabetes - slight increase in A1c noted.   Recommend heart healthy/low cholesterol/high fiber/low sugar/low processed food diet and routine exercise.   Monitor. Plan to repeat labs in 6 months.  FreeStyle Lancets Misc    FREESTYLE LITE strip    POCT  A1C      2. Essential hypertension - repeat with slight improved.   Increase to losartan 100 mg daily. Monitor BP.   Follow up in 3 months.  losartan (COZAAR) 100 MG Tab    CBC WITH DIFFERENTIAL      3. Elevated hematocrit   Monitor.   Obtain home sleep test.  CBC WITH DIFFERENTIAL      4. Hyperlipidemia LDL goal <130   Declined medication therapy .  Recommend psyllium fiber and consider red yeast rice or cholest off otc.   Repeat labs in 3 months.  Lipid Profile    TSH WITH REFLEX TO FT4      5. Family history of  cardiovascular disease        6. Agatston coronary artery calcium score between 100 and 400        7. Obesity (BMI 30-39.9)   Recommend heart healthy/low cholesterol/high fiber/low sugar/low processed food diet and routine exercise.   Set goals/routines, obtain journal.   Patient's body mass index is 37.37 kg/m². Exercise and nutrition counseling were performed at this visit.       8. Vitamin D insufficiency   Monitor labs.  VITAMIN D,25 HYDROXY (DEFICIENCY)      9. Encounter for screening mammogram for malignant neoplasm of breast  MA-SCREENING MAMMO BILAT W/TOMOSYNTHESIS W/CAD      10. Need for pneumococcal vaccination  Pneumococcal Conjugate Vaccine 20-Valent (6 wks+)        Return in about 3 months (around 4/18/2024).    This medical record contains text that has been entered with the assistance of computer voice recognition and dictation software.  Therefore, it may contain unintended errors in text, spelling, punctuation, or grammar.

## 2024-01-26 ENCOUNTER — HOSPITAL ENCOUNTER (OUTPATIENT)
Dept: RADIOLOGY | Facility: MEDICAL CENTER | Age: 74
End: 2024-01-26
Attending: FAMILY MEDICINE
Payer: MEDICARE

## 2024-01-26 DIAGNOSIS — Z12.31 ENCOUNTER FOR SCREENING MAMMOGRAM FOR MALIGNANT NEOPLASM OF BREAST: ICD-10-CM

## 2024-01-26 PROCEDURE — 77067 SCR MAMMO BI INCL CAD: CPT

## 2024-03-12 ENCOUNTER — DOCUMENTATION (OUTPATIENT)
Dept: HEALTH INFORMATION MANAGEMENT | Facility: OTHER | Age: 74
End: 2024-03-12
Payer: MEDICARE

## 2024-04-08 ENCOUNTER — HOSPITAL ENCOUNTER (OUTPATIENT)
Dept: LAB | Facility: MEDICAL CENTER | Age: 74
End: 2024-04-08
Attending: FAMILY MEDICINE
Payer: MEDICARE

## 2024-04-08 DIAGNOSIS — E55.9 VITAMIN D INSUFFICIENCY: ICD-10-CM

## 2024-04-08 DIAGNOSIS — I10 ESSENTIAL HYPERTENSION: ICD-10-CM

## 2024-04-08 DIAGNOSIS — E78.5 HYPERLIPIDEMIA LDL GOAL <130: ICD-10-CM

## 2024-04-08 DIAGNOSIS — R71.8 ELEVATED HEMATOCRIT: ICD-10-CM

## 2024-04-08 LAB
25(OH)D3 SERPL-MCNC: 36 NG/ML (ref 30–100)
BASOPHILS # BLD AUTO: 0.7 % (ref 0–1.8)
BASOPHILS # BLD: 0.05 K/UL (ref 0–0.12)
CHOLEST SERPL-MCNC: 218 MG/DL (ref 100–199)
EOSINOPHIL # BLD AUTO: 0.22 K/UL (ref 0–0.51)
EOSINOPHIL NFR BLD: 3.2 % (ref 0–6.9)
ERYTHROCYTE [DISTWIDTH] IN BLOOD BY AUTOMATED COUNT: 42.6 FL (ref 35.9–50)
HCT VFR BLD AUTO: 50.3 % (ref 37–47)
HDLC SERPL-MCNC: 41 MG/DL
HGB BLD-MCNC: 16.1 G/DL (ref 12–16)
IMM GRANULOCYTES # BLD AUTO: 0.02 K/UL (ref 0–0.11)
IMM GRANULOCYTES NFR BLD AUTO: 0.3 % (ref 0–0.9)
LDLC SERPL CALC-MCNC: 156 MG/DL
LYMPHOCYTES # BLD AUTO: 1.87 K/UL (ref 1–4.8)
LYMPHOCYTES NFR BLD: 27.3 % (ref 22–41)
MCH RBC QN AUTO: 29.1 PG (ref 27–33)
MCHC RBC AUTO-ENTMCNC: 32 G/DL (ref 32.2–35.5)
MCV RBC AUTO: 90.8 FL (ref 81.4–97.8)
MONOCYTES # BLD AUTO: 0.65 K/UL (ref 0–0.85)
MONOCYTES NFR BLD AUTO: 9.5 % (ref 0–13.4)
NEUTROPHILS # BLD AUTO: 4.04 K/UL (ref 1.82–7.42)
NEUTROPHILS NFR BLD: 59 % (ref 44–72)
NRBC # BLD AUTO: 0 K/UL
NRBC BLD-RTO: 0 /100 WBC (ref 0–0.2)
PLATELET # BLD AUTO: 190 K/UL (ref 164–446)
PMV BLD AUTO: 11.4 FL (ref 9–12.9)
RBC # BLD AUTO: 5.54 M/UL (ref 4.2–5.4)
TRIGL SERPL-MCNC: 104 MG/DL (ref 0–149)
TSH SERPL DL<=0.005 MIU/L-ACNC: 1.66 UIU/ML (ref 0.38–5.33)
WBC # BLD AUTO: 6.9 K/UL (ref 4.8–10.8)

## 2024-04-08 PROCEDURE — 85025 COMPLETE CBC W/AUTO DIFF WBC: CPT

## 2024-04-08 PROCEDURE — 84443 ASSAY THYROID STIM HORMONE: CPT

## 2024-04-08 PROCEDURE — 82306 VITAMIN D 25 HYDROXY: CPT

## 2024-04-08 PROCEDURE — 36415 COLL VENOUS BLD VENIPUNCTURE: CPT

## 2024-04-08 PROCEDURE — 80061 LIPID PANEL: CPT

## 2024-04-18 ENCOUNTER — OFFICE VISIT (OUTPATIENT)
Dept: MEDICAL GROUP | Facility: IMAGING CENTER | Age: 74
End: 2024-04-18
Payer: MEDICARE

## 2024-04-18 VITALS
BODY MASS INDEX: 36.83 KG/M2 | SYSTOLIC BLOOD PRESSURE: 132 MMHG | OXYGEN SATURATION: 95 % | WEIGHT: 243 LBS | HEART RATE: 73 BPM | TEMPERATURE: 97.4 F | DIASTOLIC BLOOD PRESSURE: 76 MMHG | RESPIRATION RATE: 16 BRPM | HEIGHT: 68 IN

## 2024-04-18 DIAGNOSIS — Z12.11 SCREEN FOR COLON CANCER: ICD-10-CM

## 2024-04-18 DIAGNOSIS — F43.9 STRESS: ICD-10-CM

## 2024-04-18 DIAGNOSIS — L72.0 CYST OF SKIN AND SUBCUTANEOUS TISSUE: ICD-10-CM

## 2024-04-18 DIAGNOSIS — R06.83 SNORING: ICD-10-CM

## 2024-04-18 DIAGNOSIS — E66.9 OBESITY (BMI 30-39.9): ICD-10-CM

## 2024-04-18 DIAGNOSIS — R93.1 AGATSTON CORONARY ARTERY CALCIUM SCORE BETWEEN 100 AND 199: ICD-10-CM

## 2024-04-18 DIAGNOSIS — E78.5 HYPERLIPIDEMIA LDL GOAL <130: ICD-10-CM

## 2024-04-18 DIAGNOSIS — Z86.006 HISTORY OF MELANOMA IN SITU: ICD-10-CM

## 2024-04-18 DIAGNOSIS — R73.03 PREDIABETES: ICD-10-CM

## 2024-04-18 DIAGNOSIS — E55.9 VITAMIN D INSUFFICIENCY: ICD-10-CM

## 2024-04-18 DIAGNOSIS — I10 ESSENTIAL HYPERTENSION: ICD-10-CM

## 2024-04-18 DIAGNOSIS — Z82.49 FAMILY HISTORY OF CARDIOVASCULAR DISEASE: ICD-10-CM

## 2024-04-18 DIAGNOSIS — Z00.00 HEALTHCARE MAINTENANCE: ICD-10-CM

## 2024-04-18 DIAGNOSIS — D75.1 POLYCYTHEMIA: ICD-10-CM

## 2024-04-18 PROCEDURE — 3075F SYST BP GE 130 - 139MM HG: CPT | Performed by: FAMILY MEDICINE

## 2024-04-18 PROCEDURE — 99214 OFFICE O/P EST MOD 30 MIN: CPT | Performed by: FAMILY MEDICINE

## 2024-04-18 PROCEDURE — 3078F DIAST BP <80 MM HG: CPT | Performed by: FAMILY MEDICINE

## 2024-04-18 ASSESSMENT — FIBROSIS 4 INDEX: FIB4 SCORE: 1.09

## 2024-04-18 ASSESSMENT — PAIN SCALES - GENERAL: PAINLEVEL: NO PAIN

## 2024-04-18 NOTE — PROGRESS NOTES
SUBJECTIVE:    Chief Complaint   Patient presents with    Follow-Up     On lab results from 04/08/24       HPI:     Oneyda Pratt is a 73 y.o. female     Moving to [place] next month.   Son is there.   Some stress lately. Prior was exercising.   Declined further evaluation a this time. Will plan to establish with pcp and would like to see cardiology in [place].     2010-  cancer, retired, mother passed.     H/h elevated sleeps fine. Snores. Declined sleep study.   Does not hydrate well.   Declined further genetic testing.     Started on psyllium husk, but didn't drink enough water.   Has not been consistent.       Declined statin- elevated CT score 2012  Ascending aort 3.8  2017 echo cardiogmra.   Declined imaging or medications.       Losartan 50 mg daily, given 100.   130s on BP.   Glucose- has been low 80-90s.   6.4%.       Stress   Discussed weight loss.   Hematology labs and referral and sleep test discussed.   Does not like to take medication.   Red yeast rice 1200 mg bid rec.   Co q 10 daiy.     Right lower back saw derm. Cyst. Slight hyperpigment inflammatory.     Health Maintenance Due   Topic Date Due    IMM DTaP/Tdap/Td Vaccine (1 - Tdap) Never done    Colorectal Cancer Screening  Never done    Zoster (Shingles) Vaccines (1 of 2) Never done    COVID-19 Vaccine (4 - 2023-24 season) 09/01/2023    Annual Wellness Visit  10/20/2023   Reviewed recommendations.   Desires to waigt.   Did not complet cologaur 2022.         ROS:  No recent fevers or chills. No nausea or vomiting. No diarrhea. No chest pains or shortness of breath. No lower extremity edema.    Current Outpatient Medications on File Prior to Visit   Medication Sig Dispense Refill    FreeStyle Lancets Misc USE TO CHECK GLUCOSE 1 TO 2 TIMES DAILY AND AS NEEDED FOR SIGNS OF LOW OR HIGH GLUCOSE 100 Each 1    FREESTYLE LITE strip USE STRIPS TO CHECK GLUCOSE 1 TO 2 TIMES DAILY AND AS NEEDED FOR SIGNS OF SYMPTOMS OF LOW OR HIGH GLUCOSE ()  "100 Strip 1    losartan (COZAAR) 100 MG Tab Take 1 Tablet by mouth every day. (Patient taking differently: Take 50 mg by mouth every day.) 200 Tablet 1    Omega-3 Fatty Acids (OMEGA 3 500 PO) Take  by mouth. 1000      vitamin D (CHOLECALCIFEROL) 1000 UNIT Tab Take 5,000 Units by mouth every day.      influenza Vac High-Dose Quad (FLUZONE HIGH-DOSE QUADRIVALENT) 0.7 ML Suspension Prefilled Syringe injection Inject  into the shoulder, thigh, or buttocks. (Patient not taking: Reported on 4/18/2024) 0.7 mL 0    rosuvastatin (CRESTOR) 5 MG Tab Take 1 Tablet by mouth every evening. (Patient not taking: Reported on 1/18/2024) 100 Tablet 1    influenza Vac High-Dose Quad (FLUZONE HIGH-DOSE QUADRIVALENT) 0.7 ML Suspension Prefilled Syringe injection Inject  into the shoulder, thigh, or buttocks. 0.7 mL 0     No current facility-administered medications on file prior to visit.       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/2017    Malignant melanoma of skin of face (HCC) 01/23/2017       Past Medical History:   Diagnosis Date    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, excised and followed by  Dermatology         OBJECTIVE:   /76 (BP Location: Left arm, Patient Position: Sitting, BP Cuff Size: Large adult)   Pulse 73   Temp 36.3 °C (97.4 °F) (Temporal)   Resp 16   Ht 1.727 m (5' 8\")   Wt 110 kg (243 lb)   SpO2 95%   BMI 36.95 kg/m²   General: Well-developed well-nourished " female, no acute distress  Neck: supple, no lymphadenopathy- cervical or supraclavicular, no thyromegaly  Cardiovascular: regular rate and rhythm, no murmurs, gallops, rubs  Lungs: clear to auscultation bilaterally, no wheezes, crackles, or rhonchi  Abdomen: +bowel sounds, soft, nontender, nondistended, no rebound, no guarding, no hepatosplenomegaly  Extremities: no cyanosis, clubbing, edema  Skin: Warm and dry  Psych: appropriate mood and affect     Latest Reference Range & Units 04/08/24 06:20   WBC 4.8 - 10.8 K/uL 6.9   RBC 4.20 - 5.40 M/uL 5.54 (H)   Hemoglobin 12.0 - 16.0 g/dL 16.1 (H)   Hematocrit 37.0 - 47.0 % 50.3 (H)   MCV 81.4 - 97.8 fL 90.8   MCH 27.0 - 33.0 pg 29.1   MCHC 32.2 - 35.5 g/dL 32.0 (L)   RDW 35.9 - 50.0 fL 42.6   Platelet Count 164 - 446 K/uL 190   MPV 9.0 - 12.9 fL 11.4   Neutrophils-Polys 44.00 - 72.00 % 59.00   Neutrophils (Absolute) 1.82 - 7.42 K/uL 4.04   Lymphocytes 22.00 - 41.00 % 27.30   Lymphs (Absolute) 1.00 - 4.80 K/uL 1.87   Monocytes 0.00 - 13.40 % 9.50   Monos (Absolute) 0.00 - 0.85 K/uL 0.65   Eosinophils 0.00 - 6.90 % 3.20   Eos (Absolute) 0.00 - 0.51 K/uL 0.22   Basophils 0.00 - 1.80 % 0.70   Baso (Absolute) 0.00 - 0.12 K/uL 0.05   Immature Granulocytes 0.00 - 0.90 % 0.30   Immature Granulocytes (abs) 0.00 - 0.11 K/uL 0.02   Nucleated RBC 0.00 - 0.20 /100 WBC 0.00   NRBC (Absolute) K/uL 0.00   Cholesterol,Tot 100 - 199 mg/dL 218 (H)   Triglycerides 0 - 149 mg/dL 104   HDL >=40 mg/dL 41   LDL <100 mg/dL 156 (H)   25-Hydroxy   Vitamin D 25 30 - 100 ng/mL 36   TSH 0.380 - 5.330 uIU/mL 1.660   (H): Data is abnormally high  (L): Data is abnormally low    ASSESSMENT/PLAN:    73 y.o.female       No diagnosis found.        No follow-ups on file.    This medical record contains text that has been entered with the assistance of computer voice recognition and dictation software.  Therefore, it may contain unintended errors in text, spelling, punctuation, or  grammar.                             she is planning to move out of state.   Reviewed recommendations as above.   Patient will plan to establish with new PCP once she moves to [place].     This medical record contains text that has been entered with the assistance of computer voice recognition and dictation software.  Therefore, it may contain unintended errors in text, spelling, punctuation, or grammar.